# Patient Record
Sex: FEMALE | Race: WHITE | Employment: OTHER | URBAN - NONMETROPOLITAN AREA
[De-identification: names, ages, dates, MRNs, and addresses within clinical notes are randomized per-mention and may not be internally consistent; named-entity substitution may affect disease eponyms.]

---

## 2017-05-02 VITALS
HEART RATE: 80 BPM | BODY MASS INDEX: 32.73 KG/M2 | HEIGHT: 69 IN | WEIGHT: 221 LBS | SYSTOLIC BLOOD PRESSURE: 120 MMHG | DIASTOLIC BLOOD PRESSURE: 80 MMHG

## 2017-05-02 DIAGNOSIS — R53.82 CHRONIC FATIGUE: ICD-10-CM

## 2017-05-02 DIAGNOSIS — N18.1 TYPE 2 DIABETES MELLITUS WITH STAGE 1 CHRONIC KIDNEY DISEASE, UNSPECIFIED LONG TERM INSULIN USE STATUS: ICD-10-CM

## 2017-05-02 DIAGNOSIS — F41.9 ANXIETY: ICD-10-CM

## 2017-05-02 DIAGNOSIS — I48.20 CHRONIC ATRIAL FIBRILLATION (HCC): ICD-10-CM

## 2017-05-02 DIAGNOSIS — E11.22 TYPE 2 DIABETES MELLITUS WITH STAGE 1 CHRONIC KIDNEY DISEASE, UNSPECIFIED LONG TERM INSULIN USE STATUS: ICD-10-CM

## 2017-05-02 DIAGNOSIS — N18.9 CHRONIC KIDNEY DISEASE, UNSPECIFIED: ICD-10-CM

## 2017-05-02 DIAGNOSIS — J32.9 SINUSITIS, UNSPECIFIED CHRONICITY, UNSPECIFIED LOCATION: ICD-10-CM

## 2017-05-02 DIAGNOSIS — K58.9 IRRITABLE BOWEL SYNDROME, UNSPECIFIED TYPE: ICD-10-CM

## 2017-05-02 DIAGNOSIS — I25.10 CORONARY ARTERY DISEASE INVOLVING NATIVE HEART, ANGINA PRESENCE UNSPECIFIED, UNSPECIFIED VESSEL OR LESION TYPE: ICD-10-CM

## 2017-05-02 RX ORDER — FLUTICASONE PROPIONATE 50 MCG
1 SPRAY, SUSPENSION (ML) NASAL DAILY
COMMUNITY
End: 2017-12-11 | Stop reason: ALTCHOICE

## 2017-05-02 RX ORDER — DULOXETIN HYDROCHLORIDE 60 MG/1
60 CAPSULE, DELAYED RELEASE ORAL DAILY
COMMUNITY
End: 2017-05-25 | Stop reason: SDUPTHER

## 2017-05-02 RX ORDER — LANOLIN ALCOHOL/MO/W.PET/CERES
3 CREAM (GRAM) TOPICAL NIGHTLY PRN
COMMUNITY

## 2017-05-02 RX ORDER — RANITIDINE HCL 75 MG
75 TABLET ORAL PRN
COMMUNITY
End: 2018-06-28

## 2017-05-02 RX ORDER — BENZONATATE 100 MG/1
100 CAPSULE ORAL 3 TIMES DAILY PRN
COMMUNITY
End: 2017-05-03 | Stop reason: ALTCHOICE

## 2017-05-02 RX ORDER — LANOLIN ALCOHOL/MO/W.PET/CERES
1000 CREAM (GRAM) TOPICAL DAILY
COMMUNITY

## 2017-05-02 RX ORDER — FUROSEMIDE 20 MG/1
20 TABLET ORAL 2 TIMES DAILY
COMMUNITY
End: 2018-03-07 | Stop reason: SDUPTHER

## 2017-05-02 RX ORDER — NEBIVOLOL 5 MG/1
5 TABLET ORAL DAILY
COMMUNITY
End: 2018-05-10 | Stop reason: SDUPTHER

## 2017-05-02 RX ORDER — LATANOPROST 50 UG/ML
1 SOLUTION/ DROPS OPHTHALMIC NIGHTLY
COMMUNITY
End: 2017-12-11 | Stop reason: ALTCHOICE

## 2017-05-03 ENCOUNTER — OFFICE VISIT (OUTPATIENT)
Dept: FAMILY MEDICINE CLINIC | Age: 82
End: 2017-05-03
Payer: MEDICARE

## 2017-05-03 VITALS
HEART RATE: 76 BPM | WEIGHT: 225 LBS | DIASTOLIC BLOOD PRESSURE: 72 MMHG | SYSTOLIC BLOOD PRESSURE: 138 MMHG | HEIGHT: 68 IN | BODY MASS INDEX: 34.1 KG/M2

## 2017-05-03 DIAGNOSIS — Z95.0 PACEMAKER, ARTIFICIAL: ICD-10-CM

## 2017-05-03 DIAGNOSIS — I65.23 CAROTID STENOSIS, BILATERAL: ICD-10-CM

## 2017-05-03 DIAGNOSIS — I67.9 CEREBROVASCULAR DISEASE: ICD-10-CM

## 2017-05-03 DIAGNOSIS — Z78.0 ASYMPTOMATIC MENOPAUSAL STATE: ICD-10-CM

## 2017-05-03 DIAGNOSIS — R26.81 UNSTEADY GAIT: ICD-10-CM

## 2017-05-03 DIAGNOSIS — I10 ESSENTIAL HYPERTENSION: ICD-10-CM

## 2017-05-03 DIAGNOSIS — N18.30 CHRONIC KIDNEY DISEASE, STAGE 3 (HCC): ICD-10-CM

## 2017-05-03 DIAGNOSIS — Z00.00 ROUTINE GENERAL MEDICAL EXAMINATION AT A HEALTH CARE FACILITY: ICD-10-CM

## 2017-05-03 DIAGNOSIS — Z00.00 MEDICARE ANNUAL WELLNESS VISIT, SUBSEQUENT: Primary | ICD-10-CM

## 2017-05-03 DIAGNOSIS — E78.2 MIXED HYPERLIPIDEMIA: ICD-10-CM

## 2017-05-03 LAB — HBA1C MFR BLD: 8.3 %

## 2017-05-03 PROCEDURE — G0444 DEPRESSION SCREEN ANNUAL: HCPCS | Performed by: FAMILY MEDICINE

## 2017-05-03 PROCEDURE — 99214 OFFICE O/P EST MOD 30 MIN: CPT | Performed by: FAMILY MEDICINE

## 2017-05-03 PROCEDURE — 1123F ACP DISCUSS/DSCN MKR DOCD: CPT | Performed by: FAMILY MEDICINE

## 2017-05-03 PROCEDURE — G8598 ASA/ANTIPLAT THER USED: HCPCS | Performed by: FAMILY MEDICINE

## 2017-05-03 PROCEDURE — 1090F PRES/ABSN URINE INCON ASSESS: CPT | Performed by: FAMILY MEDICINE

## 2017-05-03 PROCEDURE — G8417 CALC BMI ABV UP PARAM F/U: HCPCS | Performed by: FAMILY MEDICINE

## 2017-05-03 PROCEDURE — 83036 HEMOGLOBIN GLYCOSYLATED A1C: CPT | Performed by: FAMILY MEDICINE

## 2017-05-03 PROCEDURE — G8427 DOCREV CUR MEDS BY ELIG CLIN: HCPCS | Performed by: FAMILY MEDICINE

## 2017-05-03 PROCEDURE — 4040F PNEUMOC VAC/ADMIN/RCVD: CPT | Performed by: FAMILY MEDICINE

## 2017-05-03 PROCEDURE — G0439 PPPS, SUBSEQ VISIT: HCPCS | Performed by: FAMILY MEDICINE

## 2017-05-03 PROCEDURE — 1036F TOBACCO NON-USER: CPT | Performed by: FAMILY MEDICINE

## 2017-05-03 PROCEDURE — G8400 PT W/DXA NO RESULTS DOC: HCPCS | Performed by: FAMILY MEDICINE

## 2017-05-03 RX ORDER — TIMOLOL MALEATE 5 MG/ML
1 SOLUTION/ DROPS OPHTHALMIC 2 TIMES DAILY
Refills: 0 | COMMUNITY
Start: 2017-02-15 | End: 2019-07-05 | Stop reason: ALTCHOICE

## 2017-05-03 ASSESSMENT — LIFESTYLE VARIABLES
HOW OFTEN DURING THE LAST YEAR HAVE YOU FOUND THAT YOU WERE NOT ABLE TO STOP DRINKING ONCE YOU HAD STARTED: 0
HOW OFTEN DURING THE LAST YEAR HAVE YOU NEEDED AN ALCOHOLIC DRINK FIRST THING IN THE MORNING TO GET YOURSELF GOING AFTER A NIGHT OF HEAVY DRINKING: 0
HOW OFTEN DURING THE LAST YEAR HAVE YOU BEEN UNABLE TO REMEMBER WHAT HAPPENED THE NIGHT BEFORE BECAUSE YOU HAD BEEN DRINKING: 0
AUDIT-C TOTAL SCORE: 2
HOW MANY STANDARD DRINKS CONTAINING ALCOHOL DO YOU HAVE ON A TYPICAL DAY: 0
AUDIT TOTAL SCORE: 2
HOW OFTEN DURING THE LAST YEAR HAVE YOU FAILED TO DO WHAT WAS NORMALLY EXPECTED FROM YOU BECAUSE OF DRINKING: 0
HAS A RELATIVE, FRIEND, DOCTOR, OR ANOTHER HEALTH PROFESSIONAL EXPRESSED CONCERN ABOUT YOUR DRINKING OR SUGGESTED YOU CUT DOWN: 0
HAVE YOU OR SOMEONE ELSE BEEN INJURED AS A RESULT OF YOUR DRINKING: 0
HOW OFTEN DO YOU HAVE SIX OR MORE DRINKS ON ONE OCCASION: 0
HOW OFTEN DO YOU HAVE A DRINK CONTAINING ALCOHOL: 2
HOW OFTEN DURING THE LAST YEAR HAVE YOU HAD A FEELING OF GUILT OR REMORSE AFTER DRINKING: 0

## 2017-05-03 ASSESSMENT — ENCOUNTER SYMPTOMS
VISUAL CHANGE: 1
BLURRED VISION: 1

## 2017-05-03 ASSESSMENT — ANXIETY QUESTIONNAIRES: GAD7 TOTAL SCORE: 5

## 2017-05-11 DIAGNOSIS — Z78.0 ASYMPTOMATIC MENOPAUSAL STATE: ICD-10-CM

## 2017-05-25 RX ORDER — DULOXETIN HYDROCHLORIDE 60 MG/1
CAPSULE, DELAYED RELEASE ORAL
Qty: 90 CAPSULE | Refills: 3 | Status: SHIPPED | OUTPATIENT
Start: 2017-05-25 | End: 2018-05-10 | Stop reason: SDUPTHER

## 2017-05-26 ENCOUNTER — TELEPHONE (OUTPATIENT)
Dept: FAMILY MEDICINE CLINIC | Age: 82
End: 2017-05-26

## 2017-06-07 ENCOUNTER — OFFICE VISIT (OUTPATIENT)
Dept: NUTRITION | Age: 82
End: 2017-06-07
Payer: MEDICARE

## 2017-06-07 DIAGNOSIS — N18.9 CHRONIC KIDNEY DISEASE, UNSPECIFIED: Primary | ICD-10-CM

## 2017-06-07 DIAGNOSIS — K58.9 IRRITABLE BOWEL SYNDROME, UNSPECIFIED TYPE: ICD-10-CM

## 2017-06-07 DIAGNOSIS — N18.30 CHRONIC KIDNEY DISEASE, STAGE 3 (HCC): ICD-10-CM

## 2017-06-07 DIAGNOSIS — N18.1 TYPE 2 DIABETES MELLITUS WITH STAGE 1 CHRONIC KIDNEY DISEASE, UNSPECIFIED LONG TERM INSULIN USE STATUS: ICD-10-CM

## 2017-06-07 DIAGNOSIS — I10 ESSENTIAL HYPERTENSION: ICD-10-CM

## 2017-06-07 DIAGNOSIS — E11.22 TYPE 2 DIABETES MELLITUS WITH STAGE 1 CHRONIC KIDNEY DISEASE, UNSPECIFIED LONG TERM INSULIN USE STATUS: ICD-10-CM

## 2017-06-07 DIAGNOSIS — E78.2 MIXED HYPERLIPIDEMIA: ICD-10-CM

## 2017-06-07 DIAGNOSIS — I65.23 CAROTID STENOSIS, BILATERAL: ICD-10-CM

## 2017-06-07 DIAGNOSIS — I67.9 CEREBROVASCULAR DISEASE: ICD-10-CM

## 2017-06-07 DIAGNOSIS — I25.10 CORONARY ARTERY DISEASE INVOLVING NATIVE HEART, ANGINA PRESENCE UNSPECIFIED, UNSPECIFIED VESSEL OR LESION TYPE: ICD-10-CM

## 2017-06-07 PROCEDURE — 99999 PR OFFICE/OUTPT VISIT,PROCEDURE ONLY: CPT | Performed by: DIETITIAN, REGISTERED

## 2017-06-07 PROCEDURE — G8428 CUR MEDS NOT DOCUMENT: HCPCS | Performed by: DIETITIAN, REGISTERED

## 2017-06-07 PROCEDURE — 1036F TOBACCO NON-USER: CPT | Performed by: DIETITIAN, REGISTERED

## 2017-06-07 PROCEDURE — G8417 CALC BMI ABV UP PARAM F/U: HCPCS | Performed by: DIETITIAN, REGISTERED

## 2017-06-07 PROCEDURE — 97802 MEDICAL NUTRITION INDIV IN: CPT | Performed by: DIETITIAN, REGISTERED

## 2017-07-24 ENCOUNTER — TELEPHONE (OUTPATIENT)
Dept: FAMILY MEDICINE CLINIC | Age: 82
End: 2017-07-24

## 2017-07-31 ENCOUNTER — TELEPHONE (OUTPATIENT)
Dept: FAMILY MEDICINE CLINIC | Age: 82
End: 2017-07-31

## 2017-09-06 ENCOUNTER — OFFICE VISIT (OUTPATIENT)
Dept: FAMILY MEDICINE CLINIC | Age: 82
End: 2017-09-06
Payer: MEDICARE

## 2017-09-06 VITALS
WEIGHT: 221 LBS | HEART RATE: 58 BPM | BODY MASS INDEX: 34.1 KG/M2 | DIASTOLIC BLOOD PRESSURE: 80 MMHG | SYSTOLIC BLOOD PRESSURE: 122 MMHG

## 2017-09-06 DIAGNOSIS — R35.89 POLYURIA: ICD-10-CM

## 2017-09-06 DIAGNOSIS — N18.9 CHRONIC KIDNEY DISEASE, UNSPECIFIED: ICD-10-CM

## 2017-09-06 DIAGNOSIS — R53.82 CHRONIC FATIGUE: ICD-10-CM

## 2017-09-06 DIAGNOSIS — N18.1 TYPE 2 DIABETES MELLITUS WITH STAGE 1 CHRONIC KIDNEY DISEASE, WITH LONG-TERM CURRENT USE OF INSULIN (HCC): Primary | ICD-10-CM

## 2017-09-06 DIAGNOSIS — Z79.4 TYPE 2 DIABETES MELLITUS WITH STAGE 1 CHRONIC KIDNEY DISEASE, WITH LONG-TERM CURRENT USE OF INSULIN (HCC): Primary | ICD-10-CM

## 2017-09-06 DIAGNOSIS — I48.20 CHRONIC ATRIAL FIBRILLATION (HCC): ICD-10-CM

## 2017-09-06 DIAGNOSIS — E11.22 TYPE 2 DIABETES MELLITUS WITH STAGE 1 CHRONIC KIDNEY DISEASE, WITH LONG-TERM CURRENT USE OF INSULIN (HCC): Primary | ICD-10-CM

## 2017-09-06 DIAGNOSIS — F41.9 ANXIETY: ICD-10-CM

## 2017-09-06 LAB
BACTERIA URINE, POC: ABNORMAL
BILIRUBIN URINE: 0 MG/DL
BLOOD, URINE: POSITIVE
CASTS URINE, POC: ABNORMAL
CLARITY: CLEAR
COLOR: YELLOW
CRYSTALS URINE, POC: ABNORMAL
EPI CELLS URINE, POC: ABNORMAL
GLUCOSE URINE: ABNORMAL
HBA1C MFR BLD: 8.4 %
KETONES, URINE: NEGATIVE
LEUKOCYTE EST, POC: ABNORMAL
NITRITE, URINE: NEGATIVE
PH UA: 5 (ref 4.5–8)
PROTEIN UA: POSITIVE
RBC URINE, POC: ABNORMAL
SPECIFIC GRAVITY UA: 1.01 (ref 1–1.03)
URINE CULTURE, ROUTINE: NORMAL
UROBILINOGEN, URINE: NORMAL
WBC URINE, POC: ABNORMAL
YEAST URINE, POC: ABNORMAL

## 2017-09-06 PROCEDURE — G8417 CALC BMI ABV UP PARAM F/U: HCPCS | Performed by: FAMILY MEDICINE

## 2017-09-06 PROCEDURE — 4040F PNEUMOC VAC/ADMIN/RCVD: CPT | Performed by: FAMILY MEDICINE

## 2017-09-06 PROCEDURE — 83036 HEMOGLOBIN GLYCOSYLATED A1C: CPT | Performed by: FAMILY MEDICINE

## 2017-09-06 PROCEDURE — G8427 DOCREV CUR MEDS BY ELIG CLIN: HCPCS | Performed by: FAMILY MEDICINE

## 2017-09-06 PROCEDURE — G8400 PT W/DXA NO RESULTS DOC: HCPCS | Performed by: FAMILY MEDICINE

## 2017-09-06 PROCEDURE — G8598 ASA/ANTIPLAT THER USED: HCPCS | Performed by: FAMILY MEDICINE

## 2017-09-06 PROCEDURE — 1123F ACP DISCUSS/DSCN MKR DOCD: CPT | Performed by: FAMILY MEDICINE

## 2017-09-06 PROCEDURE — 1036F TOBACCO NON-USER: CPT | Performed by: FAMILY MEDICINE

## 2017-09-06 PROCEDURE — 1090F PRES/ABSN URINE INCON ASSESS: CPT | Performed by: FAMILY MEDICINE

## 2017-09-06 PROCEDURE — 81000 URINALYSIS NONAUTO W/SCOPE: CPT | Performed by: FAMILY MEDICINE

## 2017-09-06 PROCEDURE — 99214 OFFICE O/P EST MOD 30 MIN: CPT | Performed by: FAMILY MEDICINE

## 2017-09-06 ASSESSMENT — ENCOUNTER SYMPTOMS: BLURRED VISION: 1

## 2017-09-11 RX ORDER — INSULIN GLARGINE 300 U/ML
INJECTION, SOLUTION SUBCUTANEOUS
Qty: 13.5 ML | Refills: 3 | Status: SHIPPED | OUTPATIENT
Start: 2017-09-11 | End: 2017-12-11

## 2017-10-09 ENCOUNTER — TELEPHONE (OUTPATIENT)
Dept: FAMILY MEDICINE CLINIC | Age: 82
End: 2017-10-09

## 2017-10-18 ENCOUNTER — TELEPHONE (OUTPATIENT)
Dept: FAMILY MEDICINE CLINIC | Age: 82
End: 2017-10-18

## 2017-10-18 DIAGNOSIS — E11.22 TYPE 2 DIABETES MELLITUS WITH STAGE 1 CHRONIC KIDNEY DISEASE, WITH LONG-TERM CURRENT USE OF INSULIN (HCC): Primary | ICD-10-CM

## 2017-10-18 DIAGNOSIS — N18.1 TYPE 2 DIABETES MELLITUS WITH STAGE 1 CHRONIC KIDNEY DISEASE, WITH LONG-TERM CURRENT USE OF INSULIN (HCC): Primary | ICD-10-CM

## 2017-10-18 DIAGNOSIS — Z79.4 TYPE 2 DIABETES MELLITUS WITH STAGE 1 CHRONIC KIDNEY DISEASE, WITH LONG-TERM CURRENT USE OF INSULIN (HCC): Primary | ICD-10-CM

## 2017-10-18 NOTE — TELEPHONE ENCOUNTER
Flex pen, wont let me put in for refill    [facility] request [facility] on the following medication(s):  Requested Prescriptions      No prescriptions requested or ordered in this encounter       Last Visit Date (If Applicable):  4/6/5404    Next Visit Date:    12/11/2017

## 2017-12-05 ENCOUNTER — NURSE ONLY (OUTPATIENT)
Dept: FAMILY MEDICINE CLINIC | Age: 82
End: 2017-12-05
Payer: MEDICARE

## 2017-12-05 ENCOUNTER — TELEPHONE (OUTPATIENT)
Dept: FAMILY MEDICINE CLINIC | Age: 82
End: 2017-12-05

## 2017-12-05 VITALS
HEART RATE: 90 BPM | WEIGHT: 225 LBS | SYSTOLIC BLOOD PRESSURE: 170 MMHG | DIASTOLIC BLOOD PRESSURE: 92 MMHG | RESPIRATION RATE: 16 BRPM | BODY MASS INDEX: 34.72 KG/M2

## 2017-12-05 DIAGNOSIS — R82.90 CLOUDY URINE: Primary | ICD-10-CM

## 2017-12-05 DIAGNOSIS — R82.90 ABNORMAL URINALYSIS: Primary | ICD-10-CM

## 2017-12-05 LAB
BILIRUBIN, POC: ABNORMAL
BLOOD URINE, POC: ABNORMAL
CLARITY, POC: ABNORMAL
COLOR, POC: ABNORMAL
GLUCOSE URINE, POC: 250
KETONES, POC: ABNORMAL
LEUKOCYTE EST, POC: 2
NITRITE, POC: ABNORMAL
PH, POC: 6
PROTEIN, POC: ABNORMAL
SPECIFIC GRAVITY, POC: 1.01
URINE CULTURE, ROUTINE: NORMAL
UROBILINOGEN, POC: ABNORMAL

## 2017-12-05 PROCEDURE — 81002 URINALYSIS NONAUTO W/O SCOPE: CPT | Performed by: NURSE PRACTITIONER

## 2017-12-05 PROCEDURE — 99211 OFF/OP EST MAY X REQ PHY/QHP: CPT | Performed by: FAMILY MEDICINE

## 2017-12-05 NOTE — TELEPHONE ENCOUNTER
Order accomplished for urine culture. Please forward then for AMM review as pt had negative culture accomplished in September also to confirm not needing referral/treatment for interstitial cystitis.

## 2017-12-05 NOTE — TELEPHONE ENCOUNTER
Took urine sample and did nurse visit for pt, POCT Urinalysis results are available in system. Requesting order for culture.   Pharmacy confirmed as [facility]

## 2017-12-06 RX ORDER — SULFAMETHOXAZOLE AND TRIMETHOPRIM 400; 80 MG/1; MG/1
1 TABLET ORAL 2 TIMES DAILY
Qty: 14 TABLET | Refills: 0 | Status: SHIPPED | OUTPATIENT
Start: 2017-12-06 | End: 2017-12-11 | Stop reason: ALTCHOICE

## 2017-12-07 ENCOUNTER — TELEPHONE (OUTPATIENT)
Dept: FAMILY MEDICINE CLINIC | Age: 82
End: 2017-12-07

## 2017-12-07 NOTE — TELEPHONE ENCOUNTER
Her culture had too much over growth on the petri dish so they were unable to identify any organisms. Do you want her to repeat the urine, she can take a sample in  There .  She is currently on ATB

## 2017-12-11 ENCOUNTER — OFFICE VISIT (OUTPATIENT)
Dept: FAMILY MEDICINE CLINIC | Age: 82
End: 2017-12-11
Payer: MEDICARE

## 2017-12-11 VITALS
SYSTOLIC BLOOD PRESSURE: 128 MMHG | DIASTOLIC BLOOD PRESSURE: 84 MMHG | OXYGEN SATURATION: 96 % | WEIGHT: 221 LBS | TEMPERATURE: 96.9 F | BODY MASS INDEX: 34.1 KG/M2 | HEART RATE: 63 BPM

## 2017-12-11 DIAGNOSIS — I65.23 CAROTID STENOSIS, BILATERAL: ICD-10-CM

## 2017-12-11 DIAGNOSIS — E11.00 UNCONTROLLED TYPE 2 DIABETES MELLITUS WITH HYPEROSMOLARITY WITHOUT COMA, UNSPECIFIED LONG TERM INSULIN USE STATUS: Primary | ICD-10-CM

## 2017-12-11 DIAGNOSIS — N18.30 CHRONIC KIDNEY DISEASE, STAGE 3 (HCC): ICD-10-CM

## 2017-12-11 DIAGNOSIS — I48.20 CHRONIC ATRIAL FIBRILLATION (HCC): ICD-10-CM

## 2017-12-11 DIAGNOSIS — I10 ESSENTIAL HYPERTENSION: ICD-10-CM

## 2017-12-11 LAB — HBA1C MFR BLD: 8.5 %

## 2017-12-11 PROCEDURE — G8598 ASA/ANTIPLAT THER USED: HCPCS | Performed by: FAMILY MEDICINE

## 2017-12-11 PROCEDURE — 1123F ACP DISCUSS/DSCN MKR DOCD: CPT | Performed by: FAMILY MEDICINE

## 2017-12-11 PROCEDURE — 1036F TOBACCO NON-USER: CPT | Performed by: FAMILY MEDICINE

## 2017-12-11 PROCEDURE — 83036 HEMOGLOBIN GLYCOSYLATED A1C: CPT | Performed by: FAMILY MEDICINE

## 2017-12-11 PROCEDURE — 1090F PRES/ABSN URINE INCON ASSESS: CPT | Performed by: FAMILY MEDICINE

## 2017-12-11 PROCEDURE — G8417 CALC BMI ABV UP PARAM F/U: HCPCS | Performed by: FAMILY MEDICINE

## 2017-12-11 PROCEDURE — G8427 DOCREV CUR MEDS BY ELIG CLIN: HCPCS | Performed by: FAMILY MEDICINE

## 2017-12-11 PROCEDURE — 99214 OFFICE O/P EST MOD 30 MIN: CPT | Performed by: FAMILY MEDICINE

## 2017-12-11 PROCEDURE — G8400 PT W/DXA NO RESULTS DOC: HCPCS | Performed by: FAMILY MEDICINE

## 2017-12-11 PROCEDURE — G8484 FLU IMMUNIZE NO ADMIN: HCPCS | Performed by: FAMILY MEDICINE

## 2017-12-11 PROCEDURE — 4040F PNEUMOC VAC/ADMIN/RCVD: CPT | Performed by: FAMILY MEDICINE

## 2017-12-11 ASSESSMENT — ENCOUNTER SYMPTOMS
BACK PAIN: 1
CONSTIPATION: 1
DIARRHEA: 1
ABDOMINAL PAIN: 1

## 2017-12-11 NOTE — PROGRESS NOTES
Review of Systems   Constitutional: Negative for activity change and appetite change. Cardiovascular: Negative for chest pain and palpitations. Gastrointestinal: Positive for abdominal pain (left and lwer side, sometimes really sharp in the upper quad), constipation and diarrhea (mostly diarrhea). Thinks she has heart burn on occassion   Endocrine: Negative for polydipsia, polyphagia and polyuria. Genitourinary: Negative for frequency and urgency. Musculoskeletal: Positive for back pain. Psychiatric/Behavioral: Negative for sleep disturbance.
[Beclomethasone] Swelling    Vasotec [Enalapril Maleate]     Vicodin [Hydrocodone-Acetaminophen]     Voltaren [Diclofenac Sodium]        Health Maintenance   Topic Date Due    DTaP/Tdap/Td vaccine (1 - Tdap) 09[birth year]20/1955    Zostavax vaccine  09/20/1996    DEXA (modify frequency per FRAX score)  09/20/2001    Pneumococcal low/med risk (2 of 2 - PCV13) 07/23/2009    Flu vaccine (1) 09/01/2017       Subjective:      Review of Systems  Constitutional: Negative for activity change and appetite change. Cardiovascular: Negative for chest pain and palpitations. Gastrointestinal: Positive for abdominal pain (left and lwer side, sometimes really sharp in the upper quad), constipation and diarrhea (mostly diarrhea). Thinks she has heart burn on occassion   Endocrine: Negative for polydipsia, polyphagia and polyuria. Genitourinary: Negative for frequency and urgency. Musculoskeletal: Positive for back pain. Psychiatric/Behavioral: Negative for sleep disturbance. Objective:     /84   Pulse 63   Temp 96.9 °F (36.1 °C) (Tympanic)   Wt 221 lb (100.2 kg)   SpO2 96%   BMI 34.10 kg/m²     Physical Exam   Constitutional: She is oriented to person, place, and time. She appears well-developed and well-nourished. HENT:   Head: Normocephalic and atraumatic. Right Ear: Tympanic membrane and ear canal normal.   Left Ear: Tympanic membrane and ear canal normal.   Eyes: Conjunctivae and EOM are normal.   Neck: Normal range of motion. Neck supple. No JVD present. No thyromegaly present. Cardiovascular: Normal rate, S1 normal and S2 normal.  An irregularly irregular rhythm present. Exam reveals no gallop, no friction rub and no decreased pulses. No murmur heard. Pulses:       Carotid pulses are 1+ on the left side. Dorsalis pedis pulses are 1+ on the right side, and 1+ on the left side. Pulmonary/Chest: Effort normal and breath sounds normal. No respiratory distress.    Musculoskeletal: She

## 2018-03-02 LAB
BUN BLDV-MCNC: 29 MG/DL (ref 7–18)
BUN BLDV-MCNC: 29 MG/DL (ref 7–18)
CALCIUM SERPL-MCNC: 9 MG/DL (ref 8.5–10.1)
CALCIUM SERPL-MCNC: 9 MG/DL (ref 8.5–10.1)
CHLORIDE BLD-SCNC: 107 MMOL/L (ref 97–107)
CHLORIDE BLD-SCNC: 107 MMOL/L (ref 97–107)
CO2: 28 MMOL/L (ref 21–32)
CO2: 28 MMOL/L (ref 21–32)
CREAT SERPL-MCNC: 1.36 MG/DL (ref 0.55–1.02)
CREAT SERPL-MCNC: 1.39 MG/DL (ref 0.55–1.02)
GFR CALCULATED: 39
GFR CALCULATED: 40
GLUCOSE: 179 MG/DL (ref 70–99)
GLUCOSE: 180 MG/DL (ref 70–99)
HCT VFR BLD CALC: 42.7 % (ref 34.6–44.1)
HEMOGLOBIN: 14.3 G/DL (ref 11.7–14.9)
MCH RBC QN AUTO: 32.4 PG (ref 27.8–33.2)
MCHC RBC AUTO-ENTMCNC: 33.6 G/DL (ref 32.7–34.8)
MCV RBC AUTO: 96.5 FL (ref 83–97.4)
PDW BLD-RTO: 13.2 % (ref 12.2–15.8)
PHOSPHORUS: 3.7 MG/DL (ref 2.5–4.9)
PLATELET # BLD: 224 10'3/UL (ref 122–359)
PMV BLD AUTO: 8.6 FL (ref 7.6–10.6)
POTASSIUM SERPL-SCNC: 4.5 MMOL/L (ref 3.5–5.1)
POTASSIUM SERPL-SCNC: 4.6 MMOL/L (ref 3.5–5.1)
RBC # BLD: 4.43 10'6/UL (ref 3.85–4.88)
SODIUM BLD-SCNC: 142 MMOL/L (ref 136–145)
SODIUM BLD-SCNC: 142 MMOL/L (ref 136–145)
TSH SERPL DL<=0.05 MIU/L-ACNC: 0.78 UIU/ML (ref 0.36–3.74)
VITAMIN B-12: 1059 PG/ML (ref 254–1320)
WBC: 7.3 10'3/UL (ref 3.2–9.3)

## 2018-04-16 ENCOUNTER — OFFICE VISIT (OUTPATIENT)
Dept: FAMILY MEDICINE CLINIC | Age: 83
End: 2018-04-16
Payer: MEDICARE

## 2018-04-16 VITALS
HEART RATE: 68 BPM | DIASTOLIC BLOOD PRESSURE: 80 MMHG | BODY MASS INDEX: 34.26 KG/M2 | SYSTOLIC BLOOD PRESSURE: 110 MMHG | WEIGHT: 222 LBS

## 2018-04-16 DIAGNOSIS — I48.20 CHRONIC ATRIAL FIBRILLATION (HCC): ICD-10-CM

## 2018-04-16 DIAGNOSIS — E11.21 TYPE 2 DIABETES MELLITUS WITH DIABETIC NEPHROPATHY, WITH LONG-TERM CURRENT USE OF INSULIN (HCC): ICD-10-CM

## 2018-04-16 DIAGNOSIS — I25.10 CORONARY ARTERY DISEASE INVOLVING NATIVE HEART, ANGINA PRESENCE UNSPECIFIED, UNSPECIFIED VESSEL OR LESION TYPE: ICD-10-CM

## 2018-04-16 DIAGNOSIS — Z79.4 TYPE 2 DIABETES MELLITUS WITH DIABETIC NEPHROPATHY, WITH LONG-TERM CURRENT USE OF INSULIN (HCC): ICD-10-CM

## 2018-04-16 DIAGNOSIS — Z95.0 PACEMAKER, ARTIFICIAL: ICD-10-CM

## 2018-04-16 DIAGNOSIS — I10 ESSENTIAL HYPERTENSION: ICD-10-CM

## 2018-04-16 DIAGNOSIS — N18.30 STAGE 3 CHRONIC KIDNEY DISEASE (HCC): ICD-10-CM

## 2018-04-16 LAB — HBA1C MFR BLD: 7.9 %

## 2018-04-16 PROCEDURE — G8417 CALC BMI ABV UP PARAM F/U: HCPCS | Performed by: FAMILY MEDICINE

## 2018-04-16 PROCEDURE — 4040F PNEUMOC VAC/ADMIN/RCVD: CPT | Performed by: FAMILY MEDICINE

## 2018-04-16 PROCEDURE — 83036 HEMOGLOBIN GLYCOSYLATED A1C: CPT | Performed by: FAMILY MEDICINE

## 2018-04-16 PROCEDURE — 1036F TOBACCO NON-USER: CPT | Performed by: FAMILY MEDICINE

## 2018-04-16 PROCEDURE — G8598 ASA/ANTIPLAT THER USED: HCPCS | Performed by: FAMILY MEDICINE

## 2018-04-16 PROCEDURE — 1123F ACP DISCUSS/DSCN MKR DOCD: CPT | Performed by: FAMILY MEDICINE

## 2018-04-16 PROCEDURE — G8427 DOCREV CUR MEDS BY ELIG CLIN: HCPCS | Performed by: FAMILY MEDICINE

## 2018-04-16 PROCEDURE — 1090F PRES/ABSN URINE INCON ASSESS: CPT | Performed by: FAMILY MEDICINE

## 2018-04-16 PROCEDURE — G8400 PT W/DXA NO RESULTS DOC: HCPCS | Performed by: FAMILY MEDICINE

## 2018-04-16 PROCEDURE — 99214 OFFICE O/P EST MOD 30 MIN: CPT | Performed by: FAMILY MEDICINE

## 2018-04-16 ASSESSMENT — ENCOUNTER SYMPTOMS
HEARTBURN: 1
SHORTNESS OF BREATH: 1

## 2018-05-10 RX ORDER — DULOXETIN HYDROCHLORIDE 60 MG/1
CAPSULE, DELAYED RELEASE ORAL
Qty: 90 CAPSULE | Refills: 3 | Status: SHIPPED | OUTPATIENT
Start: 2018-05-10 | End: 2019-06-22 | Stop reason: SDUPTHER

## 2018-05-10 RX ORDER — NEBIVOLOL HYDROCHLORIDE 5 MG/1
TABLET ORAL
Qty: 90 TABLET | Refills: 3 | Status: SHIPPED | OUTPATIENT
Start: 2018-05-10 | End: 2019-03-26 | Stop reason: SDUPTHER

## 2018-06-27 ENCOUNTER — TELEPHONE (OUTPATIENT)
Dept: FAMILY MEDICINE CLINIC | Age: 83
End: 2018-06-27

## 2018-06-27 NOTE — TELEPHONE ENCOUNTER
States she is suppose to have a follow up appt in August, just not feeling well and would like to discuss some things. Has seen some different doctors and Is suppose to start taking some new medications, would like your thoughts before doing so. States her sugar seems to be doing OK, legs are achy, weight is fluctuating. Is suppose to start taking a baby aspirin and another medication, unsure of the name of it, it is suppose to be at the pharmacy. Thought the doctor mentioned a statin medication, unsure if she should start the medication or not. APPT scheduled for tomorrow morning to discuss concerns.

## 2018-06-28 ENCOUNTER — OFFICE VISIT (OUTPATIENT)
Dept: FAMILY MEDICINE CLINIC | Age: 83
End: 2018-06-28
Payer: MEDICARE

## 2018-06-28 VITALS
SYSTOLIC BLOOD PRESSURE: 124 MMHG | HEART RATE: 64 BPM | WEIGHT: 222 LBS | BODY MASS INDEX: 34.26 KG/M2 | DIASTOLIC BLOOD PRESSURE: 80 MMHG

## 2018-06-28 DIAGNOSIS — N18.1 TYPE 2 DIABETES MELLITUS WITH STAGE 1 CHRONIC KIDNEY DISEASE, WITH LONG-TERM CURRENT USE OF INSULIN (HCC): ICD-10-CM

## 2018-06-28 DIAGNOSIS — I67.9 CEREBROVASCULAR DISEASE: ICD-10-CM

## 2018-06-28 DIAGNOSIS — R26.9 ABNORMALITY OF GAIT AND MOBILITY: ICD-10-CM

## 2018-06-28 DIAGNOSIS — Z79.4 TYPE 2 DIABETES MELLITUS WITH STAGE 1 CHRONIC KIDNEY DISEASE, WITH LONG-TERM CURRENT USE OF INSULIN (HCC): ICD-10-CM

## 2018-06-28 DIAGNOSIS — I65.23 CAROTID STENOSIS, BILATERAL: Primary | ICD-10-CM

## 2018-06-28 DIAGNOSIS — R44.1 VISUAL HALLUCINATIONS: ICD-10-CM

## 2018-06-28 DIAGNOSIS — E11.22 TYPE 2 DIABETES MELLITUS WITH STAGE 1 CHRONIC KIDNEY DISEASE, WITH LONG-TERM CURRENT USE OF INSULIN (HCC): ICD-10-CM

## 2018-06-28 DIAGNOSIS — I73.9 PERIPHERAL VASCULAR DISEASE WITH CLAUDICATION (HCC): ICD-10-CM

## 2018-06-28 PROCEDURE — 99214 OFFICE O/P EST MOD 30 MIN: CPT | Performed by: FAMILY MEDICINE

## 2018-06-28 PROCEDURE — 1036F TOBACCO NON-USER: CPT | Performed by: FAMILY MEDICINE

## 2018-06-28 PROCEDURE — G8598 ASA/ANTIPLAT THER USED: HCPCS | Performed by: FAMILY MEDICINE

## 2018-06-28 PROCEDURE — 1090F PRES/ABSN URINE INCON ASSESS: CPT | Performed by: FAMILY MEDICINE

## 2018-06-28 PROCEDURE — G8400 PT W/DXA NO RESULTS DOC: HCPCS | Performed by: FAMILY MEDICINE

## 2018-06-28 PROCEDURE — G8427 DOCREV CUR MEDS BY ELIG CLIN: HCPCS | Performed by: FAMILY MEDICINE

## 2018-06-28 PROCEDURE — 1123F ACP DISCUSS/DSCN MKR DOCD: CPT | Performed by: FAMILY MEDICINE

## 2018-06-28 PROCEDURE — G8417 CALC BMI ABV UP PARAM F/U: HCPCS | Performed by: FAMILY MEDICINE

## 2018-06-28 PROCEDURE — 4040F PNEUMOC VAC/ADMIN/RCVD: CPT | Performed by: FAMILY MEDICINE

## 2018-06-28 RX ORDER — ASPIRIN 81 MG/1
81 TABLET ORAL DAILY
Qty: 30 TABLET | Refills: 3 | Status: SHIPPED | OUTPATIENT
Start: 2018-06-28 | End: 2019-05-15 | Stop reason: ALTCHOICE

## 2018-06-28 NOTE — PROGRESS NOTES
[facility]  [address]  Dept: [phone]  Dept Fax: [phone]    Jayde Germain is a 81 y.o. female who presents today for her medical conditions/complaints as noted below. Jayde Germain is c/o of Other (the vein doctor  wants her to try medicine first for her vein narrowing, atorvastatin and aspirin. Also stted that she doens't feel well, when I asked her to elaborate she stated her eyes and her mind. She has seen the eye doctor already and he changed some eye drops, )      HPI:     HPI  Saw Dr. Hastings Officer. Had an appt with him but then saw the NP and he spent quite some time with her and he recommended her starting on a atorvastatin and aspirin. Has had trouble recently seeing things that are not there recently. When she was out with her daughter at a store and thought she saw a woman moving in front of her. Was sitting down when this happened. Does not feel safe. At her home sometimes will see things that are not there. Thinks maybe the dog was beside her moving but he is in the kennel. Has this almost every day at least once. Blood sugars have been better. The evening are good the am area good. Usually around 110or lower in the morning, at times lower than 100 at bedtime. Taking the novalog 20 at supper time. And the tresiba 50 at bedtime.     BP Readings from Last 3 Encounters:   06/28/18 124/80   04/16/18 110/80   12/11/17 128/84          (goal 120/80)    Wt Readings from Last 3 Encounters:   06/28/18 222 lb (100.7 kg)   04/16/18 222 lb (100.7 kg)   12/11/17 221 lb (100.2 kg)        Past Medical History:   Diagnosis Date    Anxiety     Carotid stenosis, bilateral     Cerebrovascular disease     Chronic kidney disease, stage 3     Emphysema of lung ([address]     Glaucoma     Hyperlipidemia     Hypertension     Pacemaker, artificial     Type II diabetes mellitus, uncontrolled ([address]       Past Surgical History: Swelling    Vasotec [Enalapril Maleate]     Vicodin [Hydrocodone-Acetaminophen]     Voltaren [Diclofenac Sodium]        Health Maintenance   Topic Date Due    DTaP/Tdap/Td vaccine (1 - Tdap) 09[birth year]20/1955    Shingles Vaccine (1 of 2 - 2 Dose Series) 09/20/1986    DEXA (modify frequency per FRAX score)  09/20/2001    Pneumococcal low/med risk (2 of 2 - PCV13) 07/23/2009    Flu vaccine (1) 09/01/2018    Potassium monitoring  03/02/2019    Creatinine monitoring  03/02/2019       Subjective:      Review of Systems   Constitutional: Positive for fatigue. Negative for activity change, appetite change, chills, diaphoresis and fever. Respiratory: Positive for shortness of breath (no change, mild long standing if she works hard). Negative for cough and chest tightness. Cardiovascular: Negative for chest pain, palpitations and leg swelling. Objective:     /80   Pulse 64   Wt 222 lb (100.7 kg)   BMI 34.26 kg/m²     Physical Exam   Constitutional: She is oriented to person, place, and time. She appears well-developed and well-nourished. HENT:   Head: Normocephalic and atraumatic. Right Ear: Tympanic membrane and ear canal normal.   Left Ear: Tympanic membrane and ear canal normal.   Eyes: Conjunctivae and EOM are normal.   Neck: Normal range of motion. Neck supple. No JVD present. No thyromegaly present. Cardiovascular: Normal rate, S1 normal and S2 normal.  An irregularly irregular rhythm present. Exam reveals no gallop, no friction rub and no decreased pulses. No murmur heard. Pulses:       Carotid pulses are 1+ on the left side. Dorsalis pedis pulses are 1+ on the right side, and 1+ on the left side. Pulmonary/Chest: Effort normal and breath sounds normal. No respiratory distress. Abdominal: Soft. Musculoskeletal: She exhibits no edema. Lymphadenopathy:     She has no cervical adenopathy. Neurological: She is alert and oriented to person, place, and time.    Skin: Skin is warm.   Psychiatric: She has a normal mood and affect. Her behavior is normal. Judgment and thought content normal.   Nursing note and vitals reviewed. Assessment/Plan:      Diagnosis Orders   1. Carotid stenosis, bilateral  aspirin EC 81 MG EC tablet    31671 - [place] Duplex Scan Extracranial, Clive- no change in her medications at this time from our office. 2. Type 2 diabetes mellitus with stage 1 chronic kidney disease, with long-term current use of insulin (HCC)  insulin aspart (NOVOLOG FLEXPEN) 100 UNIT/ML injection pen- Decrease the evening dose of the novolog in light of the low sugars    31275 - [place] Duplex Scan Extracranial, Clive   3. Cerebrovascular disease  Suspect this could be contributing to her visual issues. Check the dopplers and consider adding in a low dose of risperadol if this becomes problematic. 4. Peripheral vascular disease with claudication [facility]  [facility] [place]- add the meds per vascular and go from there   5. Abnormality of gait and mobility  [facility] [place][note 2], do think this would help   6. Visual hallucinations  Monitor these closely           Lab Results   Component Value Date    WBC 7.3 03/02/2018    HGB 14.3 03/02/2018    HCT 42.7 03/02/2018     03/02/2018     03/02/2018    K 4.5 03/02/2018     03/02/2018    CREATININE 1.36 (H) 03/02/2018    BUN 29 (H) 03/02/2018    CO2 28 03/02/2018    TSH 0.775 03/02/2018    LABA1C 7.9 04/16/2018    LABA1C 8.5 12/11/2017    LABA1C 8.4 09/06/2017       Return in about 4 weeks (around 7/26/2018). Patient given educational materials - see patient instructions. Discussed use, benefit, and side effects of prescribed medications. All patient questions answered. Pt voiced understanding. Reviewed health maintenance. Instructed to continue current medications, diet and exercise. Patient agreed with treatment plan. Follow up as directed.      Electronically signed by Lawrence Nam

## 2018-07-06 ENCOUNTER — TELEPHONE (OUTPATIENT)
Dept: FAMILY MEDICINE CLINIC | Age: 83
End: 2018-07-06

## 2018-07-06 NOTE — TELEPHONE ENCOUNTER
States I saw the vein doctor 1 week ago, he started me on Cilostazol BID, I am also taking a baby aspirin in every evening. I was having really good BS numbers but my last few PM numbers have been 359, 298, 259, 296 AM numbers 120, 149, 130, 129. When I lay down at night I have some back & chest quivering. Per AMM advised patient to give it the weekend and to call the office on Monday to let us know how she is doing. Please advise if any other recommendations.

## 2018-07-06 NOTE — TELEPHONE ENCOUNTER
Hold on the cilostazol over the weekend see how she is doing on Monday and if she is doing better then try it again but only in the morning-- once daily to ease into it.

## 2018-07-08 ASSESSMENT — ENCOUNTER SYMPTOMS
COUGH: 0
SHORTNESS OF BREATH: 1
CHEST TIGHTNESS: 0

## 2018-07-09 ENCOUNTER — TELEPHONE (OUTPATIENT)
Dept: FAMILY MEDICINE CLINIC | Age: 83
End: 2018-07-09

## 2018-07-09 NOTE — TELEPHONE ENCOUNTER
Roxanne wanted to let you know that her blood sugars are finally getting back to where they should be.

## 2018-07-12 ENCOUNTER — TELEPHONE (OUTPATIENT)
Dept: FAMILY MEDICINE CLINIC | Age: 83
End: 2018-07-12

## 2018-07-13 NOTE — TELEPHONE ENCOUNTER
Verify she is still taking the cholesterol medication (atorvastain) and the aspirin and have her continue on these.

## 2018-07-13 NOTE — TELEPHONE ENCOUNTER
See if we can get a report from the vascular referral-- I do not have any thing that documents what they recommended just Piedad telling me. Before I send in a script I would like to verify.  Can also check with the pharmacy and see if they have a script on file

## 2018-07-19 NOTE — TELEPHONE ENCOUNTER
Elise Hathaway is calling to request a refill on the following medication(s):  Requested Prescriptions     Pending Prescriptions Disp Refills    Insulin Pen Needle 31G X 5 MM MISC 300 each 3     Si each by Does not apply route 2 times daily       Last Visit Date (If Applicable):      Next Visit Date:    2018

## 2018-08-15 LAB
AGE FOR GFR: 81
ALT SERPL-CCNC: 22 UNITS/L
ANION GAP SERPL CALCULATED.3IONS-SCNC: 10 MMOL/L
AST SERPL-CCNC: 15 UNITS/L
BUN BLDV-MCNC: 38 MG/DL
CALCIUM SERPL-MCNC: 9 MG/DL
CHLORIDE BLD-SCNC: 105 MMOL/L
CHOLESTEROL/HDL RATIO: 3.1 RATIO
CHOLESTEROL: 136 MG/DL
CO2: 29 MMOL/L
CREAT SERPL-MCNC: 1.4 MG/DL
EGFR BF: 44 ML/MIN/1.73 M2
EGFR BM: 59 ML/MIN/1.73 M2
EGFR WF: 36 ML/MIN/1.73 M2
EGFR WM: 49 ML/MIN/1.73 M2
GLUCOSE: 137 MG/DL
HDL, DIRECT: 44 MG/DL
LDL CHOLESTEROL CALCULATED: 69.2 MG/DL
POTASSIUM SERPL-SCNC: 4.7 MMOL/L
SODIUM BLD-SCNC: 139 MMOL/L
TRIGL SERPL-MCNC: 114 MG/DL
VLDLC SERPL CALC-MCNC: 23 MG/DL

## 2018-08-20 ENCOUNTER — OFFICE VISIT (OUTPATIENT)
Dept: FAMILY MEDICINE CLINIC | Age: 83
End: 2018-08-20
Payer: MEDICARE

## 2018-08-20 VITALS
BODY MASS INDEX: 34.65 KG/M2 | WEIGHT: 224.56 LBS | OXYGEN SATURATION: 98 % | HEART RATE: 71 BPM | DIASTOLIC BLOOD PRESSURE: 82 MMHG | SYSTOLIC BLOOD PRESSURE: 120 MMHG

## 2018-08-20 DIAGNOSIS — Z79.4 TYPE 2 DIABETES MELLITUS WITH DIABETIC NEPHROPATHY, WITH LONG-TERM CURRENT USE OF INSULIN (HCC): Primary | ICD-10-CM

## 2018-08-20 DIAGNOSIS — I25.10 CORONARY ARTERY DISEASE INVOLVING NATIVE HEART, ANGINA PRESENCE UNSPECIFIED, UNSPECIFIED VESSEL OR LESION TYPE: ICD-10-CM

## 2018-08-20 DIAGNOSIS — N18.30 STAGE 3 CHRONIC KIDNEY DISEASE (HCC): ICD-10-CM

## 2018-08-20 DIAGNOSIS — I48.20 CHRONIC ATRIAL FIBRILLATION (HCC): ICD-10-CM

## 2018-08-20 DIAGNOSIS — N10 ACUTE PYELONEPHRITIS: ICD-10-CM

## 2018-08-20 DIAGNOSIS — E11.21 TYPE 2 DIABETES MELLITUS WITH DIABETIC NEPHROPATHY, WITH LONG-TERM CURRENT USE OF INSULIN (HCC): Primary | ICD-10-CM

## 2018-08-20 LAB — HBA1C MFR BLD: 8 %

## 2018-08-20 PROCEDURE — G8598 ASA/ANTIPLAT THER USED: HCPCS | Performed by: FAMILY MEDICINE

## 2018-08-20 PROCEDURE — 1101F PT FALLS ASSESS-DOCD LE1/YR: CPT | Performed by: FAMILY MEDICINE

## 2018-08-20 PROCEDURE — 4040F PNEUMOC VAC/ADMIN/RCVD: CPT | Performed by: FAMILY MEDICINE

## 2018-08-20 PROCEDURE — G8417 CALC BMI ABV UP PARAM F/U: HCPCS | Performed by: FAMILY MEDICINE

## 2018-08-20 PROCEDURE — 1090F PRES/ABSN URINE INCON ASSESS: CPT | Performed by: FAMILY MEDICINE

## 2018-08-20 PROCEDURE — G8427 DOCREV CUR MEDS BY ELIG CLIN: HCPCS | Performed by: FAMILY MEDICINE

## 2018-08-20 PROCEDURE — 83036 HEMOGLOBIN GLYCOSYLATED A1C: CPT | Performed by: FAMILY MEDICINE

## 2018-08-20 PROCEDURE — 99214 OFFICE O/P EST MOD 30 MIN: CPT | Performed by: FAMILY MEDICINE

## 2018-08-20 PROCEDURE — 1036F TOBACCO NON-USER: CPT | Performed by: FAMILY MEDICINE

## 2018-08-20 PROCEDURE — 1123F ACP DISCUSS/DSCN MKR DOCD: CPT | Performed by: FAMILY MEDICINE

## 2018-08-20 PROCEDURE — G8400 PT W/DXA NO RESULTS DOC: HCPCS | Performed by: FAMILY MEDICINE

## 2018-08-20 ASSESSMENT — ENCOUNTER SYMPTOMS
DIARRHEA: 0
CONSTIPATION: 0
COUGH: 1
ABDOMINAL PAIN: 0
SHORTNESS OF BREATH: 1
WHEEZING: 0

## 2018-08-20 NOTE — PROGRESS NOTES
[facility]  [address]  Dept: [phone]  Dept Fax: [phone]    Derek Delatorre is a 81 y.o. female who presents today for her medical conditions/complaints as noted below. Derek Delatorre is c/o of Diabetes; Hypertension; and Hyperlipidemia      HPI:     HPI   Derek Delatorre is a 81 y.o. female who presents for follow-up of hypertension, diabetes, and hyperlipidemia. She indicates that she is feeling well and denies any symptoms referable to her elevated blood pressure or diabetes. Specifically denies chest pain, palpitations, dyspnea, peripheral edema, thirst, frequent urination, and blurred vision. Current medication regimen is as listed below. She denies any side effects of medication, and has been taking it regularly. Home glucose readings have been variable- 130-170. Checks blood sugars 2 ties daily and occasionally a bit higher. Diabetic complications includeretinopathy, nephropathy, neuropathy and CAD. shehas been exercising regularly trying to walk occasionally. Hasbeen following a diabetic diet, some but not all the time. Has been 30 years. Did see podiatry and vascular. Tried the pletal but caused chest and back pain for lower leg pain-  Now not on anything,  Blood sugars were up on the pletal but better now. Has not made her therapy appt. Was in the ER on 8/4 for pyelonephritis. Treated with IV levaquin and then po as well as macrobid. Has been doing much better since then. Has not had the burning thay she had before. Has not had the other sx she had with urinary tract infection. States I havent had any testing done since I was at the ER to see if my kidney infection was clear. I thought that this was a good enough appt. Thinks doing better, denies anymore pain. Sometimes it feels like a ball in there but not any pain. I see a kidney doctor coming up this fall.       BP Readings from Last 3 Encounters:

## 2018-08-20 NOTE — PROGRESS NOTES
Review of Systems   Respiratory: Positive for cough ( states i have a cough all of the time ) and shortness of breath ( on occasion). Negative for wheezing. Cardiovascular: Positive for leg swelling. Negative for chest pain and palpitations. Started a new medication - pletal gave me heart palpitations, no longer taking and wasn't given anything else to take in its place. hasnt had any palpitations since. Gastrointestinal: Negative for abdominal pain, constipation and diarrhea. Genitourinary: Negative for frequency and urgency. Musculoskeletal: Positive for joint pain and myalgias. States yeah some but that's not my biggest complain. Neurological: Positive for headaches (headache before the kidney infection. ). Negative for dizziness. States I havent had any testing done since I was at the ER to see if my kidney infection was clear. I thought that this was a good enough appt. Thinks doing better, denies anymore pain. Sometimes it feels like a ball in there but not any pain. I see a kidney doctor coming up this fall.

## 2018-08-28 ENCOUNTER — TELEPHONE (OUTPATIENT)
Dept: FAMILY MEDICINE CLINIC | Age: 83
End: 2018-08-28

## 2018-08-28 NOTE — TELEPHONE ENCOUNTER
States I have a sore under my nose, I have been trying some things like bacitracin to help and its not. She gave me something when this happened a few years ago and it really helped. Is there anyway that I could have that again?

## 2018-08-29 NOTE — TELEPHONE ENCOUNTER
Can patient come to walk ins or work in-- cannot find the record of the visit so has been over 2 years.

## 2018-08-30 ENCOUNTER — OFFICE VISIT (OUTPATIENT)
Dept: FAMILY MEDICINE CLINIC | Age: 83
End: 2018-08-30
Payer: MEDICARE

## 2018-08-30 VITALS
WEIGHT: 225 LBS | HEART RATE: 66 BPM | SYSTOLIC BLOOD PRESSURE: 112 MMHG | BODY MASS INDEX: 34.72 KG/M2 | DIASTOLIC BLOOD PRESSURE: 62 MMHG

## 2018-08-30 DIAGNOSIS — M25.512 CHRONIC PAIN OF BOTH SHOULDERS: ICD-10-CM

## 2018-08-30 DIAGNOSIS — J30.89 NON-SEASONAL ALLERGIC RHINITIS, UNSPECIFIED TRIGGER: ICD-10-CM

## 2018-08-30 DIAGNOSIS — L01.00 IMPETIGO: Primary | ICD-10-CM

## 2018-08-30 DIAGNOSIS — M25.511 CHRONIC PAIN OF BOTH SHOULDERS: ICD-10-CM

## 2018-08-30 DIAGNOSIS — G89.29 CHRONIC PAIN OF BOTH SHOULDERS: ICD-10-CM

## 2018-08-30 PROCEDURE — 1123F ACP DISCUSS/DSCN MKR DOCD: CPT | Performed by: FAMILY MEDICINE

## 2018-08-30 PROCEDURE — 1036F TOBACCO NON-USER: CPT | Performed by: FAMILY MEDICINE

## 2018-08-30 PROCEDURE — G8400 PT W/DXA NO RESULTS DOC: HCPCS | Performed by: FAMILY MEDICINE

## 2018-08-30 PROCEDURE — 99213 OFFICE O/P EST LOW 20 MIN: CPT | Performed by: FAMILY MEDICINE

## 2018-08-30 PROCEDURE — 1101F PT FALLS ASSESS-DOCD LE1/YR: CPT | Performed by: FAMILY MEDICINE

## 2018-08-30 PROCEDURE — G8427 DOCREV CUR MEDS BY ELIG CLIN: HCPCS | Performed by: FAMILY MEDICINE

## 2018-08-30 PROCEDURE — 1090F PRES/ABSN URINE INCON ASSESS: CPT | Performed by: FAMILY MEDICINE

## 2018-08-30 PROCEDURE — G8598 ASA/ANTIPLAT THER USED: HCPCS | Performed by: FAMILY MEDICINE

## 2018-08-30 PROCEDURE — 4040F PNEUMOC VAC/ADMIN/RCVD: CPT | Performed by: FAMILY MEDICINE

## 2018-08-30 PROCEDURE — G8417 CALC BMI ABV UP PARAM F/U: HCPCS | Performed by: FAMILY MEDICINE

## 2018-08-30 RX ORDER — LORATADINE 10 MG/1
10 TABLET ORAL DAILY
Qty: 30 TABLET | Refills: 5 | Status: SHIPPED | OUTPATIENT
Start: 2018-08-30 | End: 2018-08-30 | Stop reason: SDUPTHER

## 2018-08-30 RX ORDER — LORATADINE 10 MG/1
10 TABLET ORAL DAILY
Qty: 30 TABLET | Refills: 5 | Status: SHIPPED | OUTPATIENT
Start: 2018-08-30 | End: 2019-04-03 | Stop reason: ALTCHOICE

## 2018-08-30 NOTE — PROGRESS NOTES
[address] [place] [address] [address]  Dept: [phone]  Dept Fax: [phone]    Sunnie Harada is a 81 y.o. female who presents today for her medical conditions/complaints as noted below. Sunnie Harada is c/o of Rash (under nose,eptum area cracks and burns)      HPI:     HPI  Has had a a drippy nose for weeks, aggravating and now her nose is very sore on the bottom of it. HAs not really tried anything for the drop from her nose. HAs all the time but is worse in the fall usually and after she eats, not sure what to try for this. No fever, no chills, no ST, no cough. Has been using the bacitracin and this seemed to help the pain but not the drip. Took a \"allergy capsule\" with 3 colors on it at home but it made her heart race. This dried the drip yesterday. As chronic ongoing pain in her shoulders for a long time. No known injury.  No swelling- wonders wonders what to do with this    BP Readings from Last 3 Encounters:   08/30/18 112/62   08/20/18 120/82   06/28/18 124/80          (goal 120/80)    Wt Readings from Last 3 Encounters:   08/30/18 225 lb (102.1 kg)   08/20/18 224 lb 9 oz (101.9 kg)   06/28/18 222 lb (100.7 kg)        Past Medical History:   Diagnosis Date    Anxiety     Carotid stenosis, bilateral     Cerebrovascular disease     Chronic kidney disease, stage 3     Emphysema of lung (HCC)     Glaucoma     Hyperlipidemia     Hypertension     Pacemaker, artificial     Type II diabetes mellitus, uncontrolled ([address]       Past Surgical History:   Procedure Laterality Date    CHOLECYSTECTOMY      COLONOSCOPY      EYE SURGERY      HYSTERECTOMY      PACEMAKER INSERTION         Family History   Problem Relation Age of Onset    Diabetes Mother     Heart Disease Father     Lung Cancer Sister        Social History   Substance Use Topics    Smoking status: Never Smoker    Smokeless tobacco: Never Used    Alcohol use No 08/15/2018    CO2 29 08/15/2018    TSH 0.775 03/02/2018    LABA1C 8.0 08/20/2018    LABA1C 7.9 04/16/2018    LABA1C 8.5 12/11/2017       Return for As scheduled. Patient given educational materials - see patient instructions. Discussed use, benefit, and side effects of prescribed medications. All patient questions answered. Pt voiced understanding. Reviewed health maintenance. Instructed to continue current medications, diet and exercise. Patient agreed with treatment plan. Follow up as directed.      Electronically signed by Young Celeste MD on 9/2/2018

## 2018-09-02 ASSESSMENT — ENCOUNTER SYMPTOMS
SHORTNESS OF BREATH: 0
WHEEZING: 0
CHEST TIGHTNESS: 0

## 2018-09-19 ENCOUNTER — TELEPHONE (OUTPATIENT)
Dept: FAMILY MEDICINE CLINIC | Age: 83
End: 2018-09-19

## 2018-09-19 DIAGNOSIS — L01.00 IMPETIGO: Primary | ICD-10-CM

## 2018-09-19 RX ORDER — DOXYCYCLINE HYCLATE 100 MG
100 TABLET ORAL 2 TIMES DAILY
Qty: 14 TABLET | Refills: 0 | Status: SHIPPED | OUTPATIENT
Start: 2018-09-19 | End: 2018-10-12 | Stop reason: SDUPTHER

## 2018-09-26 PROBLEM — J32.9 SINUSITIS: Status: RESOLVED | Noted: 2017-05-02 | Resolved: 2018-09-26

## 2018-10-10 ENCOUNTER — TELEPHONE (OUTPATIENT)
Dept: FAMILY MEDICINE CLINIC | Age: 83
End: 2018-10-10

## 2018-10-12 ENCOUNTER — OFFICE VISIT (OUTPATIENT)
Dept: FAMILY MEDICINE CLINIC | Age: 83
End: 2018-10-12
Payer: MEDICARE

## 2018-10-12 VITALS
BODY MASS INDEX: 35.23 KG/M2 | OXYGEN SATURATION: 95 % | WEIGHT: 228.31 LBS | SYSTOLIC BLOOD PRESSURE: 114 MMHG | HEART RATE: 70 BPM | DIASTOLIC BLOOD PRESSURE: 76 MMHG

## 2018-10-12 DIAGNOSIS — Z79.4 TYPE 2 DIABETES MELLITUS WITH STAGE 1 CHRONIC KIDNEY DISEASE, WITH LONG-TERM CURRENT USE OF INSULIN (HCC): ICD-10-CM

## 2018-10-12 DIAGNOSIS — N18.1 TYPE 2 DIABETES MELLITUS WITH STAGE 1 CHRONIC KIDNEY DISEASE, WITH LONG-TERM CURRENT USE OF INSULIN (HCC): ICD-10-CM

## 2018-10-12 DIAGNOSIS — J34.0 CELLULITIS OF NOSTRIL: Primary | ICD-10-CM

## 2018-10-12 DIAGNOSIS — L01.00 IMPETIGO: ICD-10-CM

## 2018-10-12 DIAGNOSIS — E11.22 TYPE 2 DIABETES MELLITUS WITH STAGE 1 CHRONIC KIDNEY DISEASE, WITH LONG-TERM CURRENT USE OF INSULIN (HCC): ICD-10-CM

## 2018-10-12 LAB
AEROBIC CULTURE: NORMAL
MISCELLANEOUS LAB TEST RESULT: NORMAL

## 2018-10-12 PROCEDURE — 87070 CULTURE OTHR SPECIMN AEROBIC: CPT | Performed by: FAMILY MEDICINE

## 2018-10-12 PROCEDURE — G8417 CALC BMI ABV UP PARAM F/U: HCPCS | Performed by: FAMILY MEDICINE

## 2018-10-12 PROCEDURE — G8400 PT W/DXA NO RESULTS DOC: HCPCS | Performed by: FAMILY MEDICINE

## 2018-10-12 PROCEDURE — 1036F TOBACCO NON-USER: CPT | Performed by: FAMILY MEDICINE

## 2018-10-12 PROCEDURE — G8427 DOCREV CUR MEDS BY ELIG CLIN: HCPCS | Performed by: FAMILY MEDICINE

## 2018-10-12 PROCEDURE — 1123F ACP DISCUSS/DSCN MKR DOCD: CPT | Performed by: FAMILY MEDICINE

## 2018-10-12 PROCEDURE — G8598 ASA/ANTIPLAT THER USED: HCPCS | Performed by: FAMILY MEDICINE

## 2018-10-12 PROCEDURE — 87101 SKIN FUNGI CULTURE: CPT | Performed by: FAMILY MEDICINE

## 2018-10-12 PROCEDURE — 4040F PNEUMOC VAC/ADMIN/RCVD: CPT | Performed by: FAMILY MEDICINE

## 2018-10-12 PROCEDURE — G8484 FLU IMMUNIZE NO ADMIN: HCPCS | Performed by: FAMILY MEDICINE

## 2018-10-12 PROCEDURE — 1101F PT FALLS ASSESS-DOCD LE1/YR: CPT | Performed by: FAMILY MEDICINE

## 2018-10-12 PROCEDURE — 99213 OFFICE O/P EST LOW 20 MIN: CPT | Performed by: FAMILY MEDICINE

## 2018-10-12 PROCEDURE — 1090F PRES/ABSN URINE INCON ASSESS: CPT | Performed by: FAMILY MEDICINE

## 2018-10-12 RX ORDER — DOXYCYCLINE HYCLATE 100 MG
100 TABLET ORAL 2 TIMES DAILY
Qty: 20 TABLET | Refills: 0 | Status: SHIPPED | OUTPATIENT
Start: 2018-10-12 | End: 2018-10-13 | Stop reason: SDUPTHER

## 2018-10-13 RX ORDER — DOXYCYCLINE HYCLATE 100 MG
100 TABLET ORAL 2 TIMES DAILY
Qty: 20 TABLET | Refills: 0 | Status: SHIPPED | OUTPATIENT
Start: 2018-10-13 | End: 2018-10-23

## 2018-10-19 DIAGNOSIS — J34.0 CELLULITIS OF NOSTRIL: ICD-10-CM

## 2018-12-17 ENCOUNTER — OFFICE VISIT (OUTPATIENT)
Dept: FAMILY MEDICINE CLINIC | Age: 83
End: 2018-12-17
Payer: MEDICARE

## 2018-12-17 VITALS
HEART RATE: 76 BPM | OXYGEN SATURATION: 97 % | BODY MASS INDEX: 35.18 KG/M2 | DIASTOLIC BLOOD PRESSURE: 68 MMHG | SYSTOLIC BLOOD PRESSURE: 114 MMHG | WEIGHT: 228 LBS

## 2018-12-17 DIAGNOSIS — I48.20 CHRONIC ATRIAL FIBRILLATION (HCC): ICD-10-CM

## 2018-12-17 DIAGNOSIS — N18.30 STAGE 3 CHRONIC KIDNEY DISEASE (HCC): ICD-10-CM

## 2018-12-17 DIAGNOSIS — I65.23 CAROTID STENOSIS, BILATERAL: ICD-10-CM

## 2018-12-17 DIAGNOSIS — I25.10 CORONARY ARTERY DISEASE INVOLVING NATIVE HEART, ANGINA PRESENCE UNSPECIFIED, UNSPECIFIED VESSEL OR LESION TYPE: ICD-10-CM

## 2018-12-17 DIAGNOSIS — Z13.31 POSITIVE DEPRESSION SCREENING: ICD-10-CM

## 2018-12-17 DIAGNOSIS — M25.512 CHRONIC LEFT SHOULDER PAIN: ICD-10-CM

## 2018-12-17 DIAGNOSIS — I73.9 PERIPHERAL VASCULAR DISEASE OF LOWER EXTREMITY (HCC): ICD-10-CM

## 2018-12-17 DIAGNOSIS — G89.29 CHRONIC LEFT SHOULDER PAIN: ICD-10-CM

## 2018-12-17 DIAGNOSIS — Z79.4 TYPE 2 DIABETES MELLITUS WITH DIABETIC NEPHROPATHY, WITH LONG-TERM CURRENT USE OF INSULIN (HCC): Primary | ICD-10-CM

## 2018-12-17 DIAGNOSIS — E11.21 TYPE 2 DIABETES MELLITUS WITH DIABETIC NEPHROPATHY, WITH LONG-TERM CURRENT USE OF INSULIN (HCC): Primary | ICD-10-CM

## 2018-12-17 LAB — HBA1C MFR BLD: 7.9 %

## 2018-12-17 PROCEDURE — 1090F PRES/ABSN URINE INCON ASSESS: CPT | Performed by: FAMILY MEDICINE

## 2018-12-17 PROCEDURE — G8427 DOCREV CUR MEDS BY ELIG CLIN: HCPCS | Performed by: FAMILY MEDICINE

## 2018-12-17 PROCEDURE — G8417 CALC BMI ABV UP PARAM F/U: HCPCS | Performed by: FAMILY MEDICINE

## 2018-12-17 PROCEDURE — G8400 PT W/DXA NO RESULTS DOC: HCPCS | Performed by: FAMILY MEDICINE

## 2018-12-17 PROCEDURE — 4040F PNEUMOC VAC/ADMIN/RCVD: CPT | Performed by: FAMILY MEDICINE

## 2018-12-17 PROCEDURE — G0444 DEPRESSION SCREEN ANNUAL: HCPCS | Performed by: FAMILY MEDICINE

## 2018-12-17 PROCEDURE — G8431 POS CLIN DEPRES SCRN F/U DOC: HCPCS | Performed by: FAMILY MEDICINE

## 2018-12-17 PROCEDURE — 83036 HEMOGLOBIN GLYCOSYLATED A1C: CPT | Performed by: FAMILY MEDICINE

## 2018-12-17 PROCEDURE — 1101F PT FALLS ASSESS-DOCD LE1/YR: CPT | Performed by: FAMILY MEDICINE

## 2018-12-17 PROCEDURE — G8598 ASA/ANTIPLAT THER USED: HCPCS | Performed by: FAMILY MEDICINE

## 2018-12-17 PROCEDURE — 99214 OFFICE O/P EST MOD 30 MIN: CPT | Performed by: FAMILY MEDICINE

## 2018-12-17 PROCEDURE — 1036F TOBACCO NON-USER: CPT | Performed by: FAMILY MEDICINE

## 2018-12-17 PROCEDURE — 1123F ACP DISCUSS/DSCN MKR DOCD: CPT | Performed by: FAMILY MEDICINE

## 2018-12-17 PROCEDURE — G8484 FLU IMMUNIZE NO ADMIN: HCPCS | Performed by: FAMILY MEDICINE

## 2018-12-17 RX ORDER — AZELASTINE HCL 205.5 UG/1
SPRAY NASAL
COMMUNITY
End: 2020-09-10

## 2018-12-17 ASSESSMENT — ENCOUNTER SYMPTOMS
ABDOMINAL PAIN: 1
CONSTIPATION: 1
WHEEZING: 0
DIARRHEA: 1
SHORTNESS OF BREATH: 0
COUGH: 1

## 2018-12-17 ASSESSMENT — PATIENT HEALTH QUESTIONNAIRE - PHQ9
10. IF YOU CHECKED OFF ANY PROBLEMS, HOW DIFFICULT HAVE THESE PROBLEMS MADE IT FOR YOU TO DO YOUR WORK, TAKE CARE OF THINGS AT HOME, OR GET ALONG WITH OTHER PEOPLE: 1
SUM OF ALL RESPONSES TO PHQ QUESTIONS 1-9: 14
SUM OF ALL RESPONSES TO PHQ9 QUESTIONS 1 & 2: 6
6. FEELING BAD ABOUT YOURSELF - OR THAT YOU ARE A FAILURE OR HAVE LET YOURSELF OR YOUR FAMILY DOWN: 0
7. TROUBLE CONCENTRATING ON THINGS, SUCH AS READING THE NEWSPAPER OR WATCHING TELEVISION: 1
1. LITTLE INTEREST OR PLEASURE IN DOING THINGS: 3
3. TROUBLE FALLING OR STAYING ASLEEP: 1
8. MOVING OR SPEAKING SO SLOWLY THAT OTHER PEOPLE COULD HAVE NOTICED. OR THE OPPOSITE, BEING SO FIGETY OR RESTLESS THAT YOU HAVE BEEN MOVING AROUND A LOT MORE THAN USUAL: 0
5. POOR APPETITE OR OVEREATING: 3
SUM OF ALL RESPONSES TO PHQ QUESTIONS 1-9: 14
2. FEELING DOWN, DEPRESSED OR HOPELESS: 3
9. THOUGHTS THAT YOU WOULD BE BETTER OFF DEAD, OR OF HURTING YOURSELF: 0
4. FEELING TIRED OR HAVING LITTLE ENERGY: 3

## 2018-12-17 NOTE — PROGRESS NOTES
the left arm. Has ached since then. Comes and goes. When she tries to lift her arm she feels it. Even sitting in the chair will ache at times, not waking her up at night.       BP Readings from Last 3 Encounters:   12/17/18 114/68   10/12/18 114/76   08/30/18 112/62          (goal 120/80)    Wt Readings from Last 3 Encounters:   12/17/18 228 lb (103.4 kg)   10/12/18 228 lb 5 oz (103.6 kg)   08/30/18 225 lb (102.1 kg)        Past Medical History:   Diagnosis Date    Anxiety     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     Glaucoma     Hyperlipidemia     Hypertension     Pacemaker, artificial     Peripheral vascular disease of lower extremity ([facility] 12/17/2018    Type II diabetes mellitus, uncontrolled ([facility]       Past Surgical History:   Procedure Laterality Date    CHOLECYSTECTOMY      COLONOSCOPY      EYE SURGERY      HYSTERECTOMY      PACEMAKER INSERTION         Family History   Problem Relation Age of Onset    Diabetes Mother     Heart Disease Father     Lung Cancer Sister        Social History   Substance Use Topics    Smoking status: Never Smoker    Smokeless tobacco: Never Used    Alcohol use No      Current Outpatient Prescriptions   Medication Sig Dispense Refill    azelastine HCl 0.15 % SOLN azelastine 0.15 % (205.5 mcg) nasal spray      diltiazem (CARDIZEM LA) 120 MG TB24 extended release tablet Take 120 mg by mouth daily      insulin aspart (NOVOLOG FLEXPEN) 100 UNIT/ML injection pen 15 units before biggest meal and also sliding scale 5 pen 5    loratadine (CLARITIN) 10 MG tablet Take 1 tablet by mouth daily 30 tablet 5    Insulin Pen Needle 31G X 5 MM MISC 1 each by Does not apply route 2 times daily 300 each 3    aspirin EC 81 MG EC tablet Take 1 tablet by mouth daily 30 tablet 3    DULoxetine (CYMBALTA) 60 MG extended release capsule TAKE 1 CAPSULE DAILY 90 capsule 3    BYSTOLIC 5 MG tablet

## 2019-01-09 ENCOUNTER — TELEPHONE (OUTPATIENT)
Dept: FAMILY MEDICINE CLINIC | Age: 84
End: 2019-01-09

## 2019-03-22 ENCOUNTER — OFFICE VISIT (OUTPATIENT)
Dept: FAMILY MEDICINE CLINIC | Age: 84
End: 2019-03-22
Payer: MEDICARE

## 2019-03-22 VITALS
DIASTOLIC BLOOD PRESSURE: 82 MMHG | OXYGEN SATURATION: 94 % | SYSTOLIC BLOOD PRESSURE: 130 MMHG | BODY MASS INDEX: 36.2 KG/M2 | HEART RATE: 98 BPM | WEIGHT: 234.6 LBS

## 2019-03-22 DIAGNOSIS — I48.20 CHRONIC ATRIAL FIBRILLATION (HCC): ICD-10-CM

## 2019-03-22 DIAGNOSIS — N18.30 STAGE 3 CHRONIC KIDNEY DISEASE (HCC): ICD-10-CM

## 2019-03-22 DIAGNOSIS — I73.9 PERIPHERAL VASCULAR DISEASE WITH CLAUDICATION (HCC): ICD-10-CM

## 2019-03-22 DIAGNOSIS — I73.9 PERIPHERAL VASCULAR DISEASE OF LOWER EXTREMITY (HCC): ICD-10-CM

## 2019-03-22 DIAGNOSIS — R60.0 LOCALIZED EDEMA: ICD-10-CM

## 2019-03-22 DIAGNOSIS — I50.23 ACUTE ON CHRONIC SYSTOLIC CONGESTIVE HEART FAILURE (HCC): Primary | ICD-10-CM

## 2019-03-22 PROCEDURE — G8427 DOCREV CUR MEDS BY ELIG CLIN: HCPCS | Performed by: FAMILY MEDICINE

## 2019-03-22 PROCEDURE — G8417 CALC BMI ABV UP PARAM F/U: HCPCS | Performed by: FAMILY MEDICINE

## 2019-03-22 PROCEDURE — 99214 OFFICE O/P EST MOD 30 MIN: CPT | Performed by: FAMILY MEDICINE

## 2019-03-22 PROCEDURE — 1090F PRES/ABSN URINE INCON ASSESS: CPT | Performed by: FAMILY MEDICINE

## 2019-03-22 PROCEDURE — 1123F ACP DISCUSS/DSCN MKR DOCD: CPT | Performed by: FAMILY MEDICINE

## 2019-03-22 PROCEDURE — 1101F PT FALLS ASSESS-DOCD LE1/YR: CPT | Performed by: FAMILY MEDICINE

## 2019-03-22 PROCEDURE — 1036F TOBACCO NON-USER: CPT | Performed by: FAMILY MEDICINE

## 2019-03-22 PROCEDURE — 4040F PNEUMOC VAC/ADMIN/RCVD: CPT | Performed by: FAMILY MEDICINE

## 2019-03-22 PROCEDURE — G8599 NO ASA/ANTIPLAT THER USE RNG: HCPCS | Performed by: FAMILY MEDICINE

## 2019-03-22 PROCEDURE — G8484 FLU IMMUNIZE NO ADMIN: HCPCS | Performed by: FAMILY MEDICINE

## 2019-03-22 PROCEDURE — G8400 PT W/DXA NO RESULTS DOC: HCPCS | Performed by: FAMILY MEDICINE

## 2019-03-22 RX ORDER — FUROSEMIDE 20 MG/1
20 TABLET ORAL DAILY
Qty: 45 TABLET | Refills: 11
Start: 2019-03-22 | End: 2019-04-03 | Stop reason: SDUPTHER

## 2019-03-25 ENCOUNTER — TELEPHONE (OUTPATIENT)
Dept: FAMILY MEDICINE CLINIC | Age: 84
End: 2019-03-25

## 2019-03-26 RX ORDER — NEBIVOLOL 5 MG/1
10 TABLET ORAL DAILY
Qty: 90 TABLET | Refills: 3
Start: 2019-03-26 | End: 2019-03-27 | Stop reason: DRUGHIGH

## 2019-03-27 LAB
AGE FOR GFR: 82
ANION GAP SERPL CALCULATED.3IONS-SCNC: 11 MMOL/L
B-TYPE NATRIURETIC PEPTIDE: 1100 PG/ML (ref 0–100)
BUN BLDV-MCNC: 32 MG/DL (ref 7–17)
CHLORIDE BLD-SCNC: 101 MMOL/L (ref 98–120)
CO2: 28 MMOL/L (ref 22–31)
CREAT SERPL-MCNC: 1.3 MG/DL (ref 0.5–1)
EGFR BF: 47 ML/MIN/1.73 M2
EGFR BM: 64 ML/MIN/1.73 M2
EGFR WF: 39 ML/MIN/1.73 M2
EGFR WM: 53 ML/MIN/1.73 M2
POTASSIUM SERPL-SCNC: 5.1 MMOL/L (ref 3.6–5)
SODIUM BLD-SCNC: 135 MMOL/L (ref 135–145)

## 2019-03-27 RX ORDER — NEBIVOLOL 5 MG/1
5 TABLET ORAL DAILY
Qty: 90 TABLET | Refills: 1
Start: 2019-03-27 | End: 2019-08-22

## 2019-03-28 ENCOUNTER — OFFICE VISIT (OUTPATIENT)
Dept: FAMILY MEDICINE CLINIC | Age: 84
End: 2019-03-28
Payer: MEDICARE

## 2019-03-28 VITALS
OXYGEN SATURATION: 94 % | DIASTOLIC BLOOD PRESSURE: 82 MMHG | HEART RATE: 60 BPM | SYSTOLIC BLOOD PRESSURE: 128 MMHG | BODY MASS INDEX: 36.34 KG/M2 | WEIGHT: 235.5 LBS

## 2019-03-28 DIAGNOSIS — I10 ESSENTIAL HYPERTENSION: ICD-10-CM

## 2019-03-28 DIAGNOSIS — M54.9 ACUTE UPPER BACK PAIN: ICD-10-CM

## 2019-03-28 DIAGNOSIS — N18.30 STAGE 3 CHRONIC KIDNEY DISEASE (HCC): ICD-10-CM

## 2019-03-28 DIAGNOSIS — Z95.0 PACEMAKER, ARTIFICIAL: ICD-10-CM

## 2019-03-28 DIAGNOSIS — E11.21 TYPE 2 DIABETES MELLITUS WITH DIABETIC NEPHROPATHY, WITH LONG-TERM CURRENT USE OF INSULIN (HCC): ICD-10-CM

## 2019-03-28 DIAGNOSIS — Z79.4 TYPE 2 DIABETES MELLITUS WITH DIABETIC NEPHROPATHY, WITH LONG-TERM CURRENT USE OF INSULIN (HCC): ICD-10-CM

## 2019-03-28 DIAGNOSIS — I48.20 CHRONIC ATRIAL FIBRILLATION (HCC): ICD-10-CM

## 2019-03-28 DIAGNOSIS — R05.9 COUGH: ICD-10-CM

## 2019-03-28 DIAGNOSIS — I50.23 ACUTE ON CHRONIC SYSTOLIC CONGESTIVE HEART FAILURE (HCC): Primary | ICD-10-CM

## 2019-03-28 LAB
AGE FOR GFR: 82
ANION GAP SERPL CALCULATED.3IONS-SCNC: 11 MMOL/L
B-TYPE NATRIURETIC PEPTIDE: 2150 PG/ML (ref 0–100)
BASOPHILS # BLD: 0.03 THOU/MM3 (ref 0–0.3)
BUN BLDV-MCNC: 34 MG/DL (ref 7–17)
CHLORIDE BLD-SCNC: 102 MMOL/L (ref 98–120)
CO2: 28 MMOL/L (ref 22–31)
CREAT SERPL-MCNC: 1.3 MG/DL (ref 0.5–1)
D DIMER: <245 NG/ML (ref 0–245)
DIFFERENTIAL: AUTOMATED DIFF
EGFR BF: 47 ML/MIN/1.73 M2
EGFR BM: 64 ML/MIN/1.73 M2
EGFR WF: 39 ML/MIN/1.73 M2
EGFR WM: 53 ML/MIN/1.73 M2
EOSINOPHIL # BLD: 0.03 THOU/MM3 (ref 0–1.1)
FLU A ANTIGEN: POSITIVE
FLU B ANTIGEN: NEGATIVE
HCT VFR BLD CALC: 43.8 % (ref 37–47)
HEMOGLOBIN: 13.7 G/DL (ref 12–16)
LYMPHOCYTES # BLD: 1 THOU/MM3 (ref 1–5.5)
MAGNESIUM: 2 MG/DL (ref 1.6–2.3)
MCH RBC QN AUTO: 30 PG (ref 28.5–32)
MCHC RBC AUTO-ENTMCNC: 31.4 G/DL (ref 32–37)
MCV RBC AUTO: 95.5 FL (ref 80–94)
MONOCYTES # BLD: 0.61 THOU/MM3 (ref 0.1–1)
NEUTROPHILS: 2.91 THOU/MM3 (ref 2–8.1)
PDW BLD-RTO: 12.9 % (ref 8.5–15.5)
PLATELET # BLD: 183 THOU/MM3 (ref 130–400)
PMV BLD AUTO: 7.6 FL (ref 7.4–11)
POTASSIUM SERPL-SCNC: 4.6 MMOL/L (ref 3.6–5)
RBC # BLD: 4.59 M/UL (ref 4.2–5.4)
SODIUM BLD-SCNC: 136 MMOL/L (ref 135–145)
WBC # BLD: 4.57 THOU/ML3 (ref 4.8–10)

## 2019-03-29 LAB
AGE FOR GFR: 82
ANION GAP SERPL CALCULATED.3IONS-SCNC: 11 MMOL/L
BUN BLDV-MCNC: 43 MG/DL (ref 7–17)
CHLORIDE BLD-SCNC: 102 MMOL/L (ref 98–120)
CO2: 27 MMOL/L (ref 22–31)
CREAT SERPL-MCNC: 1.7 MG/DL (ref 0.5–1)
EGFR BF: 35 ML/MIN/1.73 M2
EGFR BM: 47 ML/MIN/1.73 M2
EGFR WF: 29 ML/MIN/1.73 M2
EGFR WM: 39 ML/MIN/1.73 M2
POTASSIUM SERPL-SCNC: 4.3 MMOL/L (ref 3.6–5)
SODIUM BLD-SCNC: 136 MMOL/L (ref 135–145)

## 2019-03-30 LAB
AGE FOR GFR: 82
ANION GAP SERPL CALCULATED.3IONS-SCNC: 11 MMOL/L
B-TYPE NATRIURETIC PEPTIDE: 1230 PG/ML (ref 0–100)
BUN BLDV-MCNC: 44 MG/DL (ref 7–17)
CHLORIDE BLD-SCNC: 106 MMOL/L (ref 98–120)
CO2: 25 MMOL/L (ref 22–31)
CREAT SERPL-MCNC: 1.6 MG/DL (ref 0.5–1)
EGFR BF: 37 ML/MIN/1.73 M2
EGFR BM: 50 ML/MIN/1.73 M2
EGFR WF: 31 ML/MIN/1.73 M2
EGFR WM: 42 ML/MIN/1.73 M2
POTASSIUM SERPL-SCNC: 3.9 MMOL/L (ref 3.6–5)
SODIUM BLD-SCNC: 138 MMOL/L (ref 135–145)

## 2019-04-01 ENCOUNTER — TELEPHONE (OUTPATIENT)
Dept: FAMILY MEDICINE CLINIC | Age: 84
End: 2019-04-01

## 2019-04-01 NOTE — TELEPHONE ENCOUNTER
[facility] Trans[facility] Initial Follow Up Call    Outreach made within 2 business days of discharge: Yes    Patient: Fabiano Manzanares Patient : 1935   MRN: [id number]  Reason for Admission: Flu   Discharge Date:  3/30/3019       Spoke with: Sierra Brush, feels weak and still coughing but not as bad  Discharge department/facility: [facility]    TCM Interactive Patient Contact:  Was patient able to fill all prescriptions: Yes  Was patient instructed to bring all medications to the follow-up visit: Yes  Is patient taking all medications as directed in the discharge summary?  Yes  Does patient understand their discharge instructions: Yes  Does patient have questions or concerns that need addressed prior to 7-14 day follow up office visit: no    Scheduled appointment with PCP within 7-14 days    Follow Up  Future Appointments   Date Time Provider Lorraine Zelaya   4/3/2019  3:00 PM Pepe Rutledge MD [facility]   2019 10:45 AM Pepe Rutledge MD [facility]       Jasmyn Wilkerson LPN

## 2019-04-03 ENCOUNTER — OFFICE VISIT (OUTPATIENT)
Dept: FAMILY MEDICINE CLINIC | Age: 84
End: 2019-04-03
Payer: MEDICARE

## 2019-04-03 VITALS
BODY MASS INDEX: 35.86 KG/M2 | OXYGEN SATURATION: 95 % | HEART RATE: 77 BPM | DIASTOLIC BLOOD PRESSURE: 78 MMHG | SYSTOLIC BLOOD PRESSURE: 118 MMHG | WEIGHT: 232.38 LBS

## 2019-04-03 DIAGNOSIS — J10.1 INFLUENZA A: ICD-10-CM

## 2019-04-03 DIAGNOSIS — I50.42 CHRONIC COMBINED SYSTOLIC AND DIASTOLIC CONGESTIVE HEART FAILURE (HCC): ICD-10-CM

## 2019-04-03 DIAGNOSIS — E11.21 TYPE 2 DIABETES MELLITUS WITH DIABETIC NEPHROPATHY, WITH LONG-TERM CURRENT USE OF INSULIN (HCC): ICD-10-CM

## 2019-04-03 DIAGNOSIS — I48.20 CHRONIC ATRIAL FIBRILLATION (HCC): ICD-10-CM

## 2019-04-03 DIAGNOSIS — R60.0 LOCALIZED EDEMA: ICD-10-CM

## 2019-04-03 DIAGNOSIS — Z79.4 TYPE 2 DIABETES MELLITUS WITH DIABETIC NEPHROPATHY, WITH LONG-TERM CURRENT USE OF INSULIN (HCC): ICD-10-CM

## 2019-04-03 LAB
AGE FOR GFR: 82
ANION GAP SERPL CALCULATED.3IONS-SCNC: 14 MMOL/L
B-TYPE NATRIURETIC PEPTIDE: 1980 PG/ML (ref 0–100)
BUN BLDV-MCNC: 38 MG/DL (ref 7–17)
CHLORIDE BLD-SCNC: 101 MMOL/L (ref 98–120)
CO2: 26 MMOL/L (ref 22–31)
CREAT SERPL-MCNC: 1.3 MG/DL (ref 0.5–1)
EGFR BF: 47 ML/MIN/1.73 M2
EGFR BM: 64 ML/MIN/1.73 M2
EGFR WF: 39 ML/MIN/1.73 M2
EGFR WM: 53 ML/MIN/1.73 M2
POTASSIUM SERPL-SCNC: 4.8 MMOL/L (ref 3.6–5)
SODIUM BLD-SCNC: 136 MMOL/L (ref 135–145)

## 2019-04-03 PROCEDURE — 1111F DSCHRG MED/CURRENT MED MERGE: CPT | Performed by: FAMILY MEDICINE

## 2019-04-03 PROCEDURE — 99496 TRANSJ CARE MGMT HIGH F2F 7D: CPT | Performed by: FAMILY MEDICINE

## 2019-04-03 RX ORDER — FUROSEMIDE 20 MG/1
40 TABLET ORAL DAILY
Qty: 180 TABLET | Refills: 3 | Status: SHIPPED | OUTPATIENT
Start: 2019-04-03 | End: 2019-07-05 | Stop reason: ALTCHOICE

## 2019-04-03 RX ORDER — CETIRIZINE HYDROCHLORIDE 10 MG/1
10 TABLET ORAL DAILY
Qty: 90 TABLET | Refills: 1
Start: 2019-04-03 | End: 2020-09-10

## 2019-04-03 NOTE — PROGRESS NOTES
Post-Discharge Transitional Care Management Services or Hospital Follow Up      Josh Newsome   YOB: 1935    Date of Office Visit:  4/3/2019  Date of Hospital Admission: 3/28  Date of Hospital Discharge: 3/30    Care management risk score Rising risk (score 2-5) and Complex Care (Scores >=6): 4     Non face to face  following discharge, date last encounter closed (first attempt may have been earlier): 4/1/2019  2:41 PM     Call initiated 2 business days of discharge: Yes    Patient Active Problem List   Diagnosis    Anxiety    Chronic kidney disease    Coronary artery disease    Chronic atrial fibrillation ([facility] [facility]    Chronic fatigue    Irritable bowel    Cerebrovascular disease    Hypertension    Hyperlipidemia    Type 2 diabetes mellitus with diabetic nephropathy, with long-term current use of insulin ([facility] [address]    Carotid stenosis, bilateral    Pacemaker, artificial    Peripheral vascular disease of lower extremity (HCC)       Allergies   Allergen Reactions    Pletal [Cilostazol]      Chest quivers and increased blood sugars    Ammonium Hydroxide     Avandia [Rosiglitazone]     Cephalexin     Codeine     Feldene [Piroxicam]     Levaquin [Levofloxacin In D5w]      Sweating, irritable.  Lexapro [Escitalopram]     Metoprolol     Metronidazole     Penicillins     Pilocarpine Hydrochloride [Pilocarpine]      Opthalmic solution.  Gives headache      Propoxyphene      Darvocet    Qvar [Beclomethasone] Swelling    Vasotec [Enalapril Maleate]     Vicodin [Hydrocodone-Acetaminophen]     Voltaren [Diclofenac Sodium]        Medications listed as ordered at the [facility]     Medications marked \"taking\" at this time  Outpatient Medications Marked as Taking for the 4/3/19 encounter (Office Visit) with Georgiana Manzanares MD   Medication Sig Dispense Refill    Insulin Degludec (TRESIBA FLEXTOUCH) 100 UNIT/ML SOPN Inject 25 Units into the skin daily Indications: taking 25 units daily 5 pen 5    furosemide (LASIX) 20 MG tablet Take 2 tablets by mouth daily Indications: taking two tabs daily 180 tablet 3    cetirizine (ZYRTEC) 10 MG tablet Take 1 tablet by mouth daily 90 tablet 1    nebivolol (BYSTOLIC) 5 MG tablet Take 1 tablet by mouth daily 90 tablet 1    azelastine HCl 0.15 % SOLN azelastine 0.15 % (205.5 mcg) nasal spray      insulin aspart (NOVOLOG FLEXPEN) 100 UNIT/ML injection pen 15 units before biggest meal and also sliding scale 5 pen 5    Insulin Pen Needle 31G X 5 MM MISC 1 each by Does not apply route 2 times daily 300 each 3    aspirin EC 81 MG EC tablet Take 1 tablet by mouth daily 30 tablet 3    DULoxetine (CYMBALTA) 60 MG extended release capsule TAKE 1 CAPSULE DAILY 90 capsule 3    Handicap Placard MISC by Does not apply route The disability is expected to last lifetime  Dx: knee pain 1 each 0    apixaban (ELIQUIS) 2.5 MG TABS tablet Take 1 tablet by mouth 2 times daily 180 tablet 3    timolol (TIMOPTIC) 0.5 % ophthalmic solution Place 1 drop into both eyes 2 times daily  0    vitamin B-12 (CYANOCOBALAMIN) 1000 MCG tablet Take 1,000 mcg by mouth daily      psyllium (KONSYL) 28.3 % PACK Take 1 packet by mouth daily      melatonin 3 MG TABS tablet Take 3 mg by mouth nightly as needed           Medications patient taking as of now reconciled against medications ordered at time of hospital discharge: Yes    Chief Complaint   Patient presents with    Follow-Up from [facility]  3/28-3/30 in the hospital for CHF and tested positive for influenza a. states I am lousy, days arent good, but nights are worse. coughing alot which is causing pain in my back and my ribs. has no energy. feels rattly and wheezy. i am short of breath i even have to stop when getting dressed or undressed i cant do anything without stopping. Admitted to [facility] with CHF and influenza. Treated from Thursday to Sat for the influenza and CHF. Started on Tamiflu and IV diuresis. Also had significan LE edema complicated with the PVD. Had A fib with RVR when she was admitted and this lead to the fluid overload as well. Inpatient course: Discharge summary reviewed- see chart. Interval history/Current status: Since she has been home has been somewhat weaker, feeling better overall however. Cough is not gone but is better. Has coughing jags that persist.  Has not been feeling at all like she was having any palpitations. Sugars have been good since she has been home. No dizziness, no CP, SOB is improved significantly. Vitals:    04/03/19 1537   BP: 118/78   Pulse: 77   SpO2: 95%   Weight: 232 lb 6 oz (105.4 kg)     Body mass index is 35.86 kg/m². Wt Readings from Last 3 Encounters:   04/03/19 232 lb 6 oz (105.4 kg)   03/28/19 235 lb 8 oz (106.8 kg)   03/22/19 234 lb 9.6 oz (106.4 kg)     BP Readings from Last 3 Encounters:   04/03/19 118/78   03/28/19 128/82   03/22/19 130/82       Review of Systems    Physical Exam   Constitutional: She is oriented to person, place, and time. She appears well-developed and well-nourished. HENT:   Head: Normocephalic. Right Ear: External ear normal.   Left Ear: External ear normal.   Nose: Nose normal.   Mouth/Throat: Oropharynx is clear and moist. No oropharyngeal exudate. Eyes: Pupils are equal, round, and reactive to light. Conjunctivae and EOM are normal.   Neck: Normal range of motion. Neck supple. No thyromegaly present. Cardiovascular:   Murmur heard. Irregular irregular rhythm with murmur at the LSB, bradycardic   Pulmonary/Chest: Effort normal. No respiratory distress. She has no rales. Bases diminished   Abdominal: Soft. She exhibits no distension. There is no tenderness. Musculoskeletal: She exhibits edema (trace edema bilaterally, cool to touch bilaterally).    Still with mild tenderness in the mid thoracic back but improved from admission   Neurological: She is alert and oriented to person, place, and time. No cranial nerve deficit. Skin: Capillary refill takes less than 2 seconds. Psychiatric: She has a normal mood and affect. Her behavior is normal. Judgment and thought content normal.   Nursing note and vitals reviewed. Assessment/Plan:  1. Influenza A  Improved, finished the tamiflu- no further treatment needed  - DE DISCHARGE MEDS RECONCILED W/ CURRENT OUTPATIENT MED LIST    2. Type 2 diabetes mellitus with diabetic nephropathy, with long-term current use of insulin (HCC)  Continue on the current dose -- the lower 25 units at this time  - Insulin Degludec (TRESIBA FLEXTOUCH) 100 UNIT/ML SOPN; Inject 25 Units into the skin daily Indications: taking 25 units daily  Dispense: 5 pen; Refill: 5    3. Localized edema  Continue with the higher dose as tolerating and needs the higher diuretics today  - furosemide (LASIX) 20 MG tablet; Take 2 tablets by mouth daily Indications: taking two tabs daily  Dispense: 180 tablet; Refill: 3    4. Chronic atrial fibrillation (HCC)  Rate much better controlled. Will check the echo in light of the recent CHF exacerbation but suspect it was rate related with the influenza illness  - ECHO Complete 2D W Doppler W Color; Future  - DE DISCHARGE MEDS RECONCILED W/ CURRENT OUTPATIENT MED LIST    5. Chronic combined systolic and diastolic congestive heart failure (HCC)    - ECHO Complete 2D W Doppler W Color;  Future  - DE DISCHARGE MEDS RECONCILED W/ CURRENT OUTPATIENT MED LIST        Medical Decision Making: high complexity

## 2019-04-10 ENCOUNTER — TELEPHONE (OUTPATIENT)
Dept: FAMILY MEDICINE CLINIC | Age: 84
End: 2019-04-10

## 2019-04-10 DIAGNOSIS — Z79.4 TYPE 2 DIABETES MELLITUS WITH DIABETIC NEPHROPATHY, WITH LONG-TERM CURRENT USE OF INSULIN (HCC): ICD-10-CM

## 2019-04-10 DIAGNOSIS — E11.21 TYPE 2 DIABETES MELLITUS WITH DIABETIC NEPHROPATHY, WITH LONG-TERM CURRENT USE OF INSULIN (HCC): ICD-10-CM

## 2019-04-17 ENCOUNTER — TELEPHONE (OUTPATIENT)
Dept: FAMILY MEDICINE CLINIC | Age: 84
End: 2019-04-17

## 2019-04-17 ENCOUNTER — OFFICE VISIT (OUTPATIENT)
Dept: FAMILY MEDICINE CLINIC | Age: 84
End: 2019-04-17
Payer: MEDICARE

## 2019-04-17 VITALS
HEART RATE: 80 BPM | SYSTOLIC BLOOD PRESSURE: 120 MMHG | BODY MASS INDEX: 35.49 KG/M2 | RESPIRATION RATE: 24 BRPM | WEIGHT: 230 LBS | DIASTOLIC BLOOD PRESSURE: 62 MMHG

## 2019-04-17 DIAGNOSIS — B02.9 HERPES ZOSTER WITHOUT COMPLICATION: Primary | ICD-10-CM

## 2019-04-17 PROCEDURE — 1036F TOBACCO NON-USER: CPT | Performed by: NURSE PRACTITIONER

## 2019-04-17 PROCEDURE — G8599 NO ASA/ANTIPLAT THER USE RNG: HCPCS | Performed by: NURSE PRACTITIONER

## 2019-04-17 PROCEDURE — G8400 PT W/DXA NO RESULTS DOC: HCPCS | Performed by: NURSE PRACTITIONER

## 2019-04-17 PROCEDURE — 1090F PRES/ABSN URINE INCON ASSESS: CPT | Performed by: NURSE PRACTITIONER

## 2019-04-17 PROCEDURE — G8417 CALC BMI ABV UP PARAM F/U: HCPCS | Performed by: NURSE PRACTITIONER

## 2019-04-17 PROCEDURE — G8510 SCR DEP NEG, NO PLAN REQD: HCPCS | Performed by: NURSE PRACTITIONER

## 2019-04-17 PROCEDURE — 1123F ACP DISCUSS/DSCN MKR DOCD: CPT | Performed by: NURSE PRACTITIONER

## 2019-04-17 PROCEDURE — 99213 OFFICE O/P EST LOW 20 MIN: CPT | Performed by: NURSE PRACTITIONER

## 2019-04-17 PROCEDURE — 4040F PNEUMOC VAC/ADMIN/RCVD: CPT | Performed by: NURSE PRACTITIONER

## 2019-04-17 PROCEDURE — G8427 DOCREV CUR MEDS BY ELIG CLIN: HCPCS | Performed by: NURSE PRACTITIONER

## 2019-04-17 RX ORDER — VALACYCLOVIR HYDROCHLORIDE 1 G/1
1000 TABLET, FILM COATED ORAL 3 TIMES DAILY
Qty: 21 TABLET | Refills: 0 | Status: SHIPPED | OUTPATIENT
Start: 2019-04-17 | End: 2019-04-24

## 2019-04-17 ASSESSMENT — PATIENT HEALTH QUESTIONNAIRE - PHQ9
2. FEELING DOWN, DEPRESSED OR HOPELESS: 0
SUM OF ALL RESPONSES TO PHQ QUESTIONS 1-9: 0
1. LITTLE INTEREST OR PLEASURE IN DOING THINGS: 0
SUM OF ALL RESPONSES TO PHQ9 QUESTIONS 1 & 2: 0
SUM OF ALL RESPONSES TO PHQ QUESTIONS 1-9: 0

## 2019-04-17 ASSESSMENT — ENCOUNTER SYMPTOMS
ABDOMINAL PAIN: 0
BOWEL INCONTINENCE: 0
BACK PAIN: 1

## 2019-04-17 NOTE — PATIENT INSTRUCTIONS
Valtrex 3 times a day for 7 days. Stop in 3 days if there is no rash or pain. Follow up with primary care provider in 1 to 2 days. Patient Education        Shingles: Care Instructions  Your Care Instructions    Shingles (herpes zoster) causes pain and a blistered rash. The rash can appear anywhere on the body but will be on only one side of the body, the left or right. It will be in a band, a strip, or a small area. The pain can be very severe. Shingles can also cause tingling or itching in the area of the rash. The blisters scab over after a few days and heal in 2 to 4 weeks. Medicines can help you feel better and may help prevent more serious problems caused by shingles. Shingles is caused by the same virus that causes chickenpox. When you have chickenpox, the virus gets into your nerve roots and stays there (becomes dormant) long after you get over the chickenpox. If the virus becomes active again, it can cause shingles. Follow-up care is a key part of your treatment and safety. Be sure to make and go to all appointments, and call your doctor if you are having problems. It's also a good idea to know your test results and keep a list of the medicines you take. How can you care for yourself at home? · Be safe with medicines. Take your medicines exactly as prescribed. Call your doctor if you think you are having a problem with your medicine. Antiviral medicine helps you get better faster. · Try not to scratch or pick at the blisters. They will crust over and fall off on their own if you leave them alone. · Put cool, wet cloths on the area to relieve pain and itching. You can also use calamine lotion. Try not to use so much lotion that it cakes and is hard to get off. · Put cornstarch or baking soda on the sores to help dry them out so they heal faster. · Do not use thick ointment, such as petroleum jelly, on the sores. This will keep them from drying and healing.   · To help remove loose crusts, soak

## 2019-04-17 NOTE — TELEPHONE ENCOUNTER
Appt in June with Huong Voss, Patient should see if she can move that up to see if they want to adjust her meds at all based on the SOB and swelling with the echo.

## 2019-04-17 NOTE — PROGRESS NOTES
[address], APRN-CNP  [address]  Phone:  [phone]  Fax:  [phone]  Josefine Gottron is a 81 y.o. female who presents today for her medical conditions/complaints as noted below. Josefine Gottron c/o of Flank Pain (right side back pain. Hx of reccurrent kidney infections. \" I follow a kidney doctor\")      HPI:     Back Pain   This is a new problem. The current episode started today. The problem occurs constantly. The problem is unchanged. The pain is present in the thoracic spine. The quality of the pain is described as burning, stabbing and aching. The pain does not radiate. The pain is at a severity of 6/10. Exacerbated by: touch. Pertinent negatives include no abdominal pain, bladder incontinence, bowel incontinence or leg pain. Risk factors include sedentary lifestyle. She has tried nothing for the symptoms.        Wt Readings from Last 3 Encounters:   04/17/19 230 lb (104.3 kg)   04/03/19 232 lb 6 oz (105.4 kg)   03/28/19 235 lb 8 oz (106.8 kg)       Temp Readings from Last 3 Encounters:   12/11/17 96.9 °F (36.1 °C) (Tympanic)       BP Readings from Last 3 Encounters:   04/17/19 120/62   04/03/19 118/78   03/28/19 128/82       Pulse Readings from Last 3 Encounters:   04/17/19 80   04/03/19 77   03/28/19 60              Past Medical History:   Diagnosis Date    Anxiety     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 ([address]     Glaucoma     Hyperlipidemia     Hypertension     Pacemaker, artificial     Peripheral vascular disease of lower extremity ([address] 12/17/2018    Type II diabetes mellitus, uncontrolled ([address]       Past Surgical History:   Procedure Laterality Date    CHOLECYSTECTOMY      COLONOSCOPY      EYE SURGERY      HYSTERECTOMY      PACEMAKER INSERTION       Family History   Problem Relation Age of Onset    Diabetes Mother     Heart Disease Other reaction(s): Dizziness    Ammonium Hydroxide     Amoxicillin     Avandia [Rosiglitazone]     Benzalkonium Chloride     Bifidobacterium     Brimonidine Tartrate     Cephalexin     Codeine     Diclofenac     Dorzolamide     Etodolac     Feldene [Piroxicam]     Lactobacillus     Levaquin [Levofloxacin In D5w]      Sweating, irritable.  Lexapro [Escitalopram]     Metoprolol     Metronidazole     Pantoprazole     Penicillins     Pilocarpine Hydrochloride [Pilocarpine]      Opthalmic solution. Gives headache      Prednisone     Propoxyphene      Darvocet    Qvar [Beclomethasone] Swelling    Streptococci     Vasotec [Enalapril Maleate]     Vicodin [Hydrocodone-Acetaminophen]     Voltaren [Diclofenac Sodium]        No exam data present    Subjective:      Review of Systems   Gastrointestinal: Negative for abdominal pain and bowel incontinence. Genitourinary: Negative for bladder incontinence. Musculoskeletal: Positive for back pain. Objective:     /62   Pulse 80   Resp 24   Wt 230 lb (104.3 kg)   BMI 35.49 kg/m²     Physical Exam   Constitutional: She is oriented to person, place, and time. Vital signs are normal. She appears well-developed and well-nourished. She has a sickly appearance. No distress. HENT:   Head: Normocephalic. Right Ear: External ear normal.   Left Ear: External ear normal.   Nose: Nose normal.   Mouth/Throat: Oropharynx is clear and moist. No oropharyngeal exudate. Eyes: Conjunctivae and EOM are normal. Right eye exhibits no discharge. Left eye exhibits no discharge. No scleral icterus. Neck: Normal range of motion. Neck supple. Cardiovascular: Normal rate. An irregular rhythm present. Pulmonary/Chest: Effort normal and breath sounds normal. No respiratory distress. Musculoskeletal: Normal range of motion. Right shoulder: She exhibits pain. Arms:  Neurological: She is alert and oriented to person, place, and time. Skin: Skin is warm, dry and intact. Capillary refill takes less than 2 seconds. She is not diaphoretic. Psychiatric: She has a normal mood and affect. Her speech is normal and behavior is normal. Judgment and thought content normal. Cognition and memory are normal.   Nursing note and vitals reviewed. Assessment:      Diagnosis Orders   1. Herpes zoster without complication  valACYclovir (VALTREX) 1 g tablet     No results found for this visit on 04/17/19. Plan:     Valtrex 3 times a day for 7 days. Stop in 3 days if there is no rash or pain. Follow up with primary care provider in 1 to 2 days. Patient Instructions     Valtrex 3 times a day for 7 days. Stop in 3 days if there is no rash or pain. Follow up with primary care provider in 1 to 2 days. Patient Education        Shingles: Care Instructions  Your Care Instructions    Shingles (herpes zoster) causes pain and a blistered rash. The rash can appear anywhere on the body but will be on only one side of the body, the left or right. It will be in a band, a strip, or a small area. The pain can be very severe. Shingles can also cause tingling or itching in the area of the rash. The blisters scab over after a few days and heal in 2 to 4 weeks. Medicines can help you feel better and may help prevent more serious problems caused by shingles. Shingles is caused by the same virus that causes chickenpox. When you have chickenpox, the virus gets into your nerve roots and stays there (becomes dormant) long after you get over the chickenpox. If the virus becomes active again, it can cause shingles. Follow-up care is a key part of your treatment and safety. Be sure to make and go to all appointments, and call your doctor if you are having problems. It's also a good idea to know your test results and keep a list of the medicines you take. How can you care for yourself at home? · Be safe with medicines.  Take your medicines exactly as prescribed. Call your doctor if you think you are having a problem with your medicine. Antiviral medicine helps you get better faster. · Try not to scratch or pick at the blisters. They will crust over and fall off on their own if you leave them alone. · Put cool, wet cloths on the area to relieve pain and itching. You can also use calamine lotion. Try not to use so much lotion that it cakes and is hard to get off. · Put cornstarch or baking soda on the sores to help dry them out so they heal faster. · Do not use thick ointment, such as petroleum jelly, on the sores. This will keep them from drying and healing. · To help remove loose crusts, soak them in tap water. This can help decrease oozing, and dry and soothe the skin. · Take an over-the-counter pain medicine, such as acetaminophen (Tylenol), ibuprofen (Advil, Motrin), or naproxen (Aleve). Read and follow all instructions on the label. · Avoid close contact with people until the blisters have healed. It is very important for you to avoid contact with anyone who has never had chickenpox or the chickenpox vaccine. Pregnant women, young babies, and anyone else who has a hard time fighting infection (such as someone with HIV, diabetes, or cancer) is especially at risk. When should you call for help? Call your doctor now or seek immediate medical care if:    · You have a new or higher fever.     · You have a severe headache and a stiff neck.     · You lose the ability to think clearly.     · The rash spreads to your forehead, nose, eyes, or eyelids.     · You have eye pain, or your vision gets worse.     · You have new pain in your face, or you cannot move the muscles in your face.     · Blisters spread to new parts of your body.    Watch closely for changes in your health, and be sure to contact your doctor if:    · The rash has not healed after 2 to 4 weeks.     · You still have pain after the rash has healed. Where can you learn more?   Go to [email or web address] and sign in to your [employer] account. Emely Elmore in the [facility] box to learn more about \"Shingles: Care Instructions. \"     If you do not have an account, please click on the \"Sign Up Now\" link. Current as of: July 30, 2018  Content Version: 11.9  © 5863-0133 [employer]. Care instructions adapted under license by [facility] ([facility]). If you have questions about a medical condition or this instruction, always ask your healthcare professional. [place] [address] any warranty or liability for your use of this information. Patient/Caregiver instructed on use, benefit, and side effects of prescribed medications. All patient/parent/caregiver questions answered. Patient/parent/caregiver voiced understanding. Reviewed health maintenance. Instructed to continue current medications, diet and exercise. Patient agreed with treatment plan. Follow up as directed.            Electronically signed by NISHANT Henley CNP on4/17/2019

## 2019-05-15 ENCOUNTER — OFFICE VISIT (OUTPATIENT)
Dept: FAMILY MEDICINE CLINIC | Age: 84
End: 2019-05-15
Payer: MEDICARE

## 2019-05-15 VITALS
HEART RATE: 76 BPM | WEIGHT: 221.7 LBS | SYSTOLIC BLOOD PRESSURE: 122 MMHG | DIASTOLIC BLOOD PRESSURE: 80 MMHG | BODY MASS INDEX: 34.21 KG/M2 | OXYGEN SATURATION: 94 %

## 2019-05-15 DIAGNOSIS — Z79.4 TYPE 2 DIABETES MELLITUS WITH DIABETIC NEPHROPATHY, WITH LONG-TERM CURRENT USE OF INSULIN (HCC): ICD-10-CM

## 2019-05-15 DIAGNOSIS — E11.21 TYPE 2 DIABETES MELLITUS WITH DIABETIC NEPHROPATHY, WITH LONG-TERM CURRENT USE OF INSULIN (HCC): ICD-10-CM

## 2019-05-15 DIAGNOSIS — I50.42 CHRONIC COMBINED SYSTOLIC AND DIASTOLIC CONGESTIVE HEART FAILURE (HCC): Primary | ICD-10-CM

## 2019-05-15 DIAGNOSIS — N18.1 TYPE 2 DIABETES MELLITUS WITH STAGE 1 CHRONIC KIDNEY DISEASE, WITH LONG-TERM CURRENT USE OF INSULIN (HCC): ICD-10-CM

## 2019-05-15 DIAGNOSIS — E11.22 TYPE 2 DIABETES MELLITUS WITH STAGE 1 CHRONIC KIDNEY DISEASE, WITH LONG-TERM CURRENT USE OF INSULIN (HCC): ICD-10-CM

## 2019-05-15 DIAGNOSIS — R05.9 COUGH: ICD-10-CM

## 2019-05-15 DIAGNOSIS — Z79.4 TYPE 2 DIABETES MELLITUS WITH STAGE 1 CHRONIC KIDNEY DISEASE, WITH LONG-TERM CURRENT USE OF INSULIN (HCC): ICD-10-CM

## 2019-05-15 PROCEDURE — 1123F ACP DISCUSS/DSCN MKR DOCD: CPT | Performed by: FAMILY MEDICINE

## 2019-05-15 PROCEDURE — G8427 DOCREV CUR MEDS BY ELIG CLIN: HCPCS | Performed by: FAMILY MEDICINE

## 2019-05-15 PROCEDURE — G8598 ASA/ANTIPLAT THER USED: HCPCS | Performed by: FAMILY MEDICINE

## 2019-05-15 PROCEDURE — G8400 PT W/DXA NO RESULTS DOC: HCPCS | Performed by: FAMILY MEDICINE

## 2019-05-15 PROCEDURE — 1090F PRES/ABSN URINE INCON ASSESS: CPT | Performed by: FAMILY MEDICINE

## 2019-05-15 PROCEDURE — 4040F PNEUMOC VAC/ADMIN/RCVD: CPT | Performed by: FAMILY MEDICINE

## 2019-05-15 PROCEDURE — 99214 OFFICE O/P EST MOD 30 MIN: CPT | Performed by: FAMILY MEDICINE

## 2019-05-15 PROCEDURE — 1036F TOBACCO NON-USER: CPT | Performed by: FAMILY MEDICINE

## 2019-05-15 PROCEDURE — G8417 CALC BMI ABV UP PARAM F/U: HCPCS | Performed by: FAMILY MEDICINE

## 2019-05-15 RX ORDER — LATANOPROST 50 UG/ML
1 SOLUTION/ DROPS OPHTHALMIC NIGHTLY
COMMUNITY
End: 2022-01-01

## 2019-05-15 RX ORDER — BENZONATATE 100 MG/1
100 CAPSULE ORAL 3 TIMES DAILY PRN
Qty: 30 CAPSULE | Refills: 0 | Status: SHIPPED | OUTPATIENT
Start: 2019-05-15 | End: 2019-05-22

## 2019-05-15 RX ORDER — CLOPIDOGREL BISULFATE 75 MG/1
75 TABLET ORAL DAILY
COMMUNITY
End: 2020-02-14 | Stop reason: SDUPTHER

## 2019-05-15 NOTE — PROGRESS NOTES
[address] [place] [address] [address]  Dept: [phone]  Dept XKK:[phone]    Soila Gan is a 81 y.o. female who presents today for her medical conditions/complaints as notedbelow. Soila Gan is c/o of 1 Month Follow-Up (6 week follow up. seems to be coughing again. sometimes my cough comes and goes. sometimes SOB states especially if i carry anything or go up a couple of steps. swelling in legs comes and goes. switched to taking bystolic to in the evenings and that has helped some with my fatigue, currently taking a whole pill instead of a half. )      HPI:     HPI    Saw the Vascular Dr-- he did the angioplasty-- in her right leg-- was itching all over at that time. Did have Some scratched open areas. Will still have some of this occasionally, no as often or as severe. Since this procedure she has been doing much better-- the legs are not getting her up as severely. Is supposed to get some testing on the left leg now also. Did start having a bit more of a cough again recently -- seemed like it has been about 3 weeks. No change in her SOB from what it was, maybe actually a bit less frequent. Dry cough, no mucous at all. Roof of her mouth gets sore, feels like can occasionally get some bright yellowish out/ sightly grey. More heartburn recently also. Blood sugars have been very good lately. None have been too high or too low really. No issues with her medications.     BP Readings from Last 3 Encounters:   05/15/19 122/80   04/17/19 120/62   04/03/19 118/78          (goal 120/80)    Wt Readings from Last 3 Encounters:   05/15/19 221 lb 11.2 oz (100.6 kg)   04/17/19 230 lb (104.3 kg)   04/03/19 232 lb 6 oz (105.4 kg)        Past Medical History:   Diagnosis Date    Anxiety     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 ([address]     Glaucoma     Hyperlipidemia     Hypertension     Pacemaker, artificial     Peripheral vascular disease of lower extremity ([facility] 12/17/2018    Type II diabetes mellitus, uncontrolled ([facility]       Past Surgical History:   Procedure Laterality Date    CHOLECYSTECTOMY      COLONOSCOPY      EYE SURGERY      HYSTERECTOMY      PACEMAKER INSERTION         Family History   Problem Relation Age of Onset    Diabetes Mother     Heart Disease Father     Lung Cancer Sister        Social History     Tobacco Use    Smoking status: Never Smoker    Smokeless tobacco: Never Used   Substance Use Topics    Alcohol use: No      Current Outpatient Medications   Medication Sig Dispense Refill    insulin aspart (NOVOLOG FLEXPEN) 100 UNIT/ML injection pen 15 units before biggest meal and also sliding scale 16 pen 3    latanoprost (XALATAN) 0.005 % ophthalmic solution 1 drop nightly      clopidogrel (PLAVIX) 75 MG tablet Take 75 mg by mouth daily      benzonatate (TESSALON) 100 MG capsule Take 1 capsule by mouth 3 times daily as needed for Cough 30 capsule 0    Insulin Degludec (TRESIBA FLEXTOUCH) 100 UNIT/ML SOPN Inject 40 Units into the skin daily Indications: taking 25 units daily 5 pen 5    furosemide (LASIX) 20 MG tablet Take 2 tablets by mouth daily Indications: taking two tabs daily 180 tablet 3    cetirizine (ZYRTEC) 10 MG tablet Take 1 tablet by mouth daily 90 tablet 1    nebivolol (BYSTOLIC) 5 MG tablet Take 1 tablet by mouth daily 90 tablet 1    azelastine HCl 0.15 % SOLN azelastine 0.15 % (205.5 mcg) nasal spray      Insulin Pen Needle 31G X 5 MM MISC 1 each by Does not apply route 2 times daily 300 each 3    DULoxetine (CYMBALTA) 60 MG extended release capsule TAKE 1 CAPSULE DAILY 90 capsule 3    Handicap Placard MISC by Does not apply route The disability is expected to last lifetime  Dx: knee pain 1 each 0    apixaban (ELIQUIS) 2.5 MG TABS tablet Take 1 tablet by mouth 2 times daily 180 tablet 3    vitamin B-12 (CYANOCOBALAMIN) 1000 MCG tablet Take 1,000 mcg by mouth daily      psyllium (KONSYL) 28.3 % PACK Take 1 packet by mouth daily      melatonin 3 MG TABS tablet Take 3 mg by mouth nightly as needed       timolol (TIMOPTIC) 0.5 % ophthalmic solution Place 1 drop into both eyes 2 times daily  0     No current facility-administered medications for this visit. Allergies   Allergen Reactions    Cilostazol      Chest quivers and increased blood sugars  Elevated blood sugar    Amitriptyline     Amlodipine      Other reaction(s): Dizziness    Ammonium Hydroxide     Amoxicillin     Avandia [Rosiglitazone]     Benzalkonium Chloride     Bifidobacterium     Brimonidine Tartrate     Cephalexin     Codeine     Diclofenac     Dorzolamide     Etodolac     Feldene [Piroxicam]     Lactobacillus     Levaquin [Levofloxacin In D5w]      Sweating, irritable.  Lexapro [Escitalopram]     Metoprolol     Metronidazole     Pantoprazole     Penicillins     Pilocarpine Hydrochloride [Pilocarpine]      Opthalmic solution. Gives headache      Prednisone     Propoxyphene      Darvocet    Qvar [Beclomethasone] Swelling    Streptococci     Vasotec [Enalapril Maleate]     Vicodin [Hydrocodone-Acetaminophen]     Voltaren [Diclofenac Sodium]        Health Maintenance   Topic Date Due    DTaP/Tdap/Td vaccine (1 - Tdap) 09/20/1955    Shingles Vaccine (1 of 2) 09/20/1986    DEXA (modify frequency per FRAX score)  09/20/2001    Pneumococcal 65+ years Vaccine (2 of 2 - PCV13) 07/23/2009    Flu vaccine (Season Ended) 09/01/2019    Potassium monitoring  05/17/2020    Creatinine monitoring  05/17/2020       Subjective:      Review of Systems    Objective:     /80   Pulse 76   Wt 221 lb 11.2 oz (100.6 kg)   SpO2 94%   BMI 34.21 kg/m²     Physical Exam   Constitutional: She is oriented to person, place, and time. She appears well-developed and well-nourished. HENT:   Head: Normocephalic.    Right Ear: External ear normal.   Left Ear: External ear normal.   Nose: Nose normal.   Mouth/Throat: Oropharynx is clear and moist. No oropharyngeal exudate. Eyes: Pupils are equal, round, and reactive to light. Conjunctivae and EOM are normal.   Neck: Normal range of motion. Neck supple. No thyromegaly present. Cardiovascular:   Murmur heard. Irregular irregular rhythm with murmur at the LSB, bradycardic   Pulmonary/Chest: Effort normal. No respiratory distress. She has no rales. Bases diminished   Abdominal: Soft. She exhibits no distension. There is no tenderness. Musculoskeletal: She exhibits edema (trace edema bilaterally, lower legs are both now WARM, right LE has a palp pedal pulse also). Neurological: She is alert and oriented to person, place, and time. No cranial nerve deficit. Skin: Capillary refill takes less than 2 seconds. No erythema. Psychiatric: She has a normal mood and affect. Her behavior is normal. Judgment and thought content normal.   Nursing note and vitals reviewed. Assessment/Plan:      Diagnosis Orders   1. Chronic combined systolic and diastolic congestive heart failure (HCC)  Basic Metabolic Panel-- appears euvolemic. No change in her current medications, will recheck the labs to see if she has improved    Brain Natriuretic Peptide   2. Type 2 diabetes mellitus with stage 1 chronic kidney disease, with long-term current use of insulin ([facility])  insulin aspart (NOVOLOG FLEXPEN) 100 UNIT/ML injection pen-- continue on her current dose of the insulin    Basic Metabolic Panel   3. Type 2 diabetes mellitus with diabetic nephropathy, with long-term current use of insulin ([facility])  Basic Metabolic Panel   4.  Cough  benzonatate (TESSALON) 100 MG capsule- lungs are clear, no evidence of persistent infection- will continue on the tessalon but would consider recheck of the CXR If sx increase     Take the ranitidine regularly and see if this helps with the cough and the throat tickle    Lab Results   Component Value Date    WBC 4.57 (L) 03/28/2019    HGB 13.7 03/28/2019    HCT 43.8 03/28/2019     03/28/2019    CHOL 136 08/15/2018    TRIG 114 08/15/2018    ALT 22 08/15/2018    AST 15 08/15/2018     05/17/2019    K 4.4 05/17/2019     05/17/2019    CREATININE 1.4 (H) 05/17/2019    BUN 39 (H) 05/17/2019    CO2 27 05/17/2019    TSH 0.775 03/02/2018    LABA1C 7.9 12/17/2018    LABA1C 8.0 08/20/2018    LABA1C 7.9 04/16/2018       Return in about 3 months (around 8/15/2019) for HTN, DM follow up. Patient given educational materials - see patientinstructions. Discussed use, benefit, and side effects of prescribed medications. All patient questions answered. Pt voiced understanding. Reviewed health maintenance. Instructed to continue current medications, diet andexercise. Patient agreed with treatment plan. Follow up as directed.      Electronically signed by Bruna Scott MD on 5/19/2019

## 2019-05-17 ENCOUNTER — TELEPHONE (OUTPATIENT)
Dept: FAMILY MEDICINE CLINIC | Age: 84
End: 2019-05-17

## 2019-05-17 LAB
AGE FOR GFR: 82
ANION GAP SERPL CALCULATED.3IONS-SCNC: 12 MMOL/L
B-TYPE NATRIURETIC PEPTIDE: 1310 PG/ML (ref 0–100)
BUN BLDV-MCNC: 39 MG/DL (ref 7–17)
CALCIUM SERPL-MCNC: 8.8 MG/DL (ref 8.4–10.2)
CHLORIDE BLD-SCNC: 103 MMOL/L (ref 98–120)
CO2: 27 MMOL/L (ref 22–31)
CREAT SERPL-MCNC: 1.4 MG/DL (ref 0.5–1)
EGFR BF: 44 ML/MIN/1.73 M2
EGFR BM: 59 ML/MIN/1.73 M2
EGFR WF: 36 ML/MIN/1.73 M2
EGFR WM: 49 ML/MIN/1.73 M2
GLUCOSE: 161 MG/DL (ref 65–105)
POTASSIUM SERPL-SCNC: 4.4 MMOL/L (ref 3.6–5)
SODIUM BLD-SCNC: 138 MMOL/L (ref 135–145)

## 2019-06-24 NOTE — TELEPHONE ENCOUNTER
Boris Laura is calling to request a refill on the following medication(s):  Requested Prescriptions     Pending Prescriptions Disp Refills    DULoxetine (CYMBALTA) 60 MG extended release capsule [Pharmacy Med Name: DULOXETINE CAP 60MG DR] 90 capsule 3     Sig: TAKE 1 CAPSULE DAILY       Last Visit Date (If Applicable):  8/37/4339    Next Visit Date:    8/15/2019

## 2019-06-25 RX ORDER — DULOXETIN HYDROCHLORIDE 60 MG/1
CAPSULE, DELAYED RELEASE ORAL
Qty: 90 CAPSULE | Refills: 3 | Status: SHIPPED | OUTPATIENT
Start: 2019-06-25 | End: 2020-06-22 | Stop reason: SDUPTHER

## 2019-07-05 ENCOUNTER — OFFICE VISIT (OUTPATIENT)
Dept: FAMILY MEDICINE CLINIC | Age: 84
End: 2019-07-05
Payer: MEDICARE

## 2019-07-05 ENCOUNTER — TELEPHONE (OUTPATIENT)
Dept: FAMILY MEDICINE CLINIC | Age: 84
End: 2019-07-05

## 2019-07-05 VITALS
SYSTOLIC BLOOD PRESSURE: 104 MMHG | WEIGHT: 218.6 LBS | BODY MASS INDEX: 33.73 KG/M2 | OXYGEN SATURATION: 90 % | HEART RATE: 77 BPM | DIASTOLIC BLOOD PRESSURE: 62 MMHG

## 2019-07-05 DIAGNOSIS — L97.519 ULCER OF RIGHT GREAT TOE DUE TO DIABETES MELLITUS (HCC): ICD-10-CM

## 2019-07-05 DIAGNOSIS — I73.9 PERIPHERAL VASCULAR DISEASE OF LOWER EXTREMITY (HCC): ICD-10-CM

## 2019-07-05 DIAGNOSIS — I50.42 CHRONIC COMBINED SYSTOLIC AND DIASTOLIC CONGESTIVE HEART FAILURE (HCC): ICD-10-CM

## 2019-07-05 DIAGNOSIS — R60.0 LOCALIZED EDEMA: ICD-10-CM

## 2019-07-05 DIAGNOSIS — E11.621 ULCER OF RIGHT GREAT TOE DUE TO DIABETES MELLITUS (HCC): ICD-10-CM

## 2019-07-05 DIAGNOSIS — E11.21 TYPE 2 DIABETES MELLITUS WITH DIABETIC NEPHROPATHY, WITH LONG-TERM CURRENT USE OF INSULIN (HCC): ICD-10-CM

## 2019-07-05 DIAGNOSIS — I48.20 CHRONIC ATRIAL FIBRILLATION (HCC): ICD-10-CM

## 2019-07-05 DIAGNOSIS — Z79.4 TYPE 2 DIABETES MELLITUS WITH DIABETIC NEPHROPATHY, WITH LONG-TERM CURRENT USE OF INSULIN (HCC): ICD-10-CM

## 2019-07-05 DIAGNOSIS — L98.492 CALLOUS ULCER WITH FAT LAYER EXPOSED (HCC): ICD-10-CM

## 2019-07-05 DIAGNOSIS — I65.23 CAROTID STENOSIS, BILATERAL: ICD-10-CM

## 2019-07-05 LAB
AGE FOR GFR: 82
ANION GAP SERPL CALCULATED.3IONS-SCNC: 16 MMOL/L
B-TYPE NATRIURETIC PEPTIDE: 653 PG/ML (ref 0–100)
BUN BLDV-MCNC: 58 MG/DL (ref 7–17)
CALCIUM SERPL-MCNC: 8.9 MG/DL (ref 8.4–10.2)
CHLORIDE BLD-SCNC: 100 MMOL/L (ref 98–120)
CO2: 26 MMOL/L (ref 22–31)
CREAT SERPL-MCNC: 1.8 MG/DL (ref 0.5–1)
EGFR BF: 33 ML/MIN/1.73 M2
EGFR BM: 44 ML/MIN/1.73 M2
EGFR WF: 27 ML/MIN/1.73 M2
EGFR WM: 36 ML/MIN/1.73 M2
GLUCOSE: 232 MG/DL (ref 65–105)
POTASSIUM SERPL-SCNC: 5.3 MMOL/L (ref 3.6–5)
SODIUM BLD-SCNC: 137 MMOL/L (ref 135–145)

## 2019-07-05 PROCEDURE — 1036F TOBACCO NON-USER: CPT | Performed by: FAMILY MEDICINE

## 2019-07-05 PROCEDURE — G8400 PT W/DXA NO RESULTS DOC: HCPCS | Performed by: FAMILY MEDICINE

## 2019-07-05 PROCEDURE — 4040F PNEUMOC VAC/ADMIN/RCVD: CPT | Performed by: FAMILY MEDICINE

## 2019-07-05 PROCEDURE — 1090F PRES/ABSN URINE INCON ASSESS: CPT | Performed by: FAMILY MEDICINE

## 2019-07-05 PROCEDURE — G8598 ASA/ANTIPLAT THER USED: HCPCS | Performed by: FAMILY MEDICINE

## 2019-07-05 PROCEDURE — G8427 DOCREV CUR MEDS BY ELIG CLIN: HCPCS | Performed by: FAMILY MEDICINE

## 2019-07-05 PROCEDURE — 1123F ACP DISCUSS/DSCN MKR DOCD: CPT | Performed by: FAMILY MEDICINE

## 2019-07-05 PROCEDURE — G8417 CALC BMI ABV UP PARAM F/U: HCPCS | Performed by: FAMILY MEDICINE

## 2019-07-05 PROCEDURE — 99214 OFFICE O/P EST MOD 30 MIN: CPT | Performed by: FAMILY MEDICINE

## 2019-07-05 RX ORDER — BENZONATATE 100 MG/1
CAPSULE ORAL
COMMUNITY
End: 2021-01-01

## 2019-07-05 RX ORDER — FUROSEMIDE 20 MG/1
20 TABLET ORAL DAILY
Qty: 60 TABLET | Refills: 5
Start: 2019-07-05 | End: 2019-12-12

## 2019-07-05 RX ORDER — RANITIDINE HCL 75 MG
75 TABLET ORAL
COMMUNITY
End: 2019-12-12

## 2019-07-05 RX ORDER — LISINOPRIL 2.5 MG/1
TABLET ORAL
Refills: 0 | COMMUNITY
Start: 2019-05-23 | End: 2019-07-05 | Stop reason: SINTOL

## 2019-07-05 NOTE — PROGRESS NOTES
stage 3 (HCC)     [facility]     Glaucoma     Hyperlipidemia     Hypertension     Pacemaker, artificial     Peripheral vascular disease of lower extremity ([facility] 12/17/2018    Type II diabetes mellitus, uncontrolled ([facility]       Past Surgical History:   Procedure Laterality Date    CHOLECYSTECTOMY      COLONOSCOPY      EYE SURGERY      HYSTERECTOMY      PACEMAKER INSERTION         Family History   Problem Relation Age of Onset    Diabetes Mother     Heart Disease Father     Lung Cancer Sister        Social History     Tobacco Use    Smoking status: Never Smoker    Smokeless tobacco: Never Used   Substance Use Topics    Alcohol use: No      Current Outpatient Medications   Medication Sig Dispense Refill    benzonatate (TESSALON) 100 MG capsule benzonatate 100 mg capsule   Take 1 capsule 3 times a day by oral route as needed.       ranitidine (ZANTAC) 75 MG tablet Take 75 mg by mouth      furosemide (LASIX) 20 MG tablet Take 1 tablet by mouth daily With an additional 20 mg dose if weight goes up by 1# 60 tablet 5    DULoxetine (CYMBALTA) 60 MG extended release capsule TAKE 1 CAPSULE DAILY 90 capsule 3    insulin aspart (NOVOLOG FLEXPEN) 100 UNIT/ML injection pen 15 units before biggest meal and also sliding scale 16 pen 3    latanoprost (XALATAN) 0.005 % ophthalmic solution 1 drop nightly      clopidogrel (PLAVIX) 75 MG tablet Take 75 mg by mouth daily      Insulin Degludec (TRESIBA FLEXTOUCH) 100 UNIT/ML SOPN Inject 40 Units into the skin daily Indications: taking 25 units daily 5 pen 5    cetirizine (ZYRTEC) 10 MG tablet Take 1 tablet by mouth daily 90 tablet 1    nebivolol (BYSTOLIC) 5 MG tablet Take 1 tablet by mouth daily (Patient taking differently: Take 5 mg by mouth daily Indications: taking 1/2 tab by mouth daily. ) 90 tablet 1    azelastine HCl 0.15 % SOLN azelastine 0.15 % (205.5 mcg) nasal spray      Insulin Pen Needle 31G X 5 MM MISC 1 each by Does not apply route 2 suspect Roxanne his symptoms are related to volume depletion and cerebral hypoperfusion. Will check labs decrease the diuretics and try to walk the tight rope between the hypovolemia and the heart failure symptoms. Brain Natriuretic Peptide   2. Type 2 diabetes mellitus with diabetic nephropathy, with long-term current use of insulin ([facility] [address]  Diabetic Shoe   3. Peripheral vascular disease of lower extremity (HCC)   DIABETES FOOT EXAM    Diabetic Shoe   4. Carotid stenosis, bilateral   DIABETES FOOT EXAM    US Carotid Artery Bilateral-has known history of carotid stenosis. Need to do studies to ensure this is not progressed which could exacerbate or cause her symptoms   5. Chronic atrial fibrillation ([facility])  Diabetic Shoe    Basic Metabolic Panel-has pacer and symptoms are more suggestive of hypoperfusion then rate control issues but will cardiology to re-interrogate her pacer    Brain Natriuretic Peptide   6. Ulcer of right great toe due to diabetes mellitus ([facility]   DIABETES FOOT EXAM    Diabetic Shoe   7. Callous ulcer with fat layer exposed ([facility]   DIABETES FOOT EXAM    Diabetic Shoe continue with wound care and diabetic shoes ordered today   8. Localized edema       Patient Instructions   Change the furosemide (laisx) to 1 tablet every day with one additional tablet on the days that the weight has gone up 1 lb or more. Also watch for extra swelling or SOB. Check the labs today. Get the carotid USN and the diabetic shoes.         Lab Results   Component Value Date    WBC 4.57 (L) 03/28/2019    HGB 13.7 03/28/2019    HCT 43.8 03/28/2019     03/28/2019    CHOL 136 08/15/2018    TRIG 114 08/15/2018    ALT 22 08/15/2018    AST 15 08/15/2018     07/05/2019    K 5.3 (H) 07/05/2019     07/05/2019    CREATININE 1.8 (H) 07/05/2019    BUN 58 (H) 07/05/2019    CO2 26 07/05/2019    TSH 0.775 03/02/2018    LABA1C 7.9 12/17/2018    LABA1C 8.0 08/20/2018    LABA1C 7.9 04/16/2018       Return in

## 2019-07-07 PROBLEM — I11.0 HYPERTENSIVE HEART DISEASE WITH CHRONIC COMBINED SYSTOLIC AND DIASTOLIC CONGESTIVE HEART FAILURE (HCC): Status: ACTIVE | Noted: 2019-05-23

## 2019-07-07 PROBLEM — I50.42 HYPERTENSIVE HEART DISEASE WITH CHRONIC COMBINED SYSTOLIC AND DIASTOLIC CONGESTIVE HEART FAILURE (HCC): Status: ACTIVE | Noted: 2019-05-23

## 2019-07-07 PROBLEM — I42.9 CARDIOMYOPATHY (HCC): Status: ACTIVE | Noted: 2019-05-23

## 2019-07-07 PROBLEM — E87.5 HYPERKALEMIA: Status: ACTIVE | Noted: 2019-07-07

## 2019-07-17 DIAGNOSIS — E11.21 TYPE 2 DIABETES MELLITUS WITH DIABETIC NEPHROPATHY, WITH LONG-TERM CURRENT USE OF INSULIN (HCC): ICD-10-CM

## 2019-07-17 DIAGNOSIS — Z79.4 TYPE 2 DIABETES MELLITUS WITH DIABETIC NEPHROPATHY, WITH LONG-TERM CURRENT USE OF INSULIN (HCC): ICD-10-CM

## 2019-07-18 RX ORDER — INSULIN DEGLUDEC INJECTION 100 U/ML
INJECTION, SOLUTION SUBCUTANEOUS
Qty: 45 ML | Refills: 3 | Status: SHIPPED | OUTPATIENT
Start: 2019-07-18 | End: 2019-12-12

## 2019-07-18 NOTE — TELEPHONE ENCOUNTER
[facility] is calling to request a refill on the following medication(s):  Requested Prescriptions     Pending Prescriptions Disp Refills    TRESIBA FLEXTOUCH 100 UNIT/ML SOPN [Pharmacy Med Name: TRESIBA FLEX PEN 100U/ML] 45 mL 3     Sig: INJECT 50 UNITS            SUBCUTANEOUSLY DAILY       Last Visit Date (If Applicable):  4/5/4476    Next Visit Date:    8/15/2019

## 2019-07-26 NOTE — TELEPHONE ENCOUNTER
Sophy Chaney is calling to request a refill on the following medication(s):  Requested Prescriptions     Pending Prescriptions Disp Refills    Insulin Pen Needle 31G X 5 MM MISC 300 each 3     Si each by Does not apply route 2 times daily       Last Visit Date (If Applicable):  3/0/7206    Next Visit Date:    2019

## 2019-08-22 ENCOUNTER — OFFICE VISIT (OUTPATIENT)
Dept: FAMILY MEDICINE CLINIC | Age: 84
End: 2019-08-22
Payer: MEDICARE

## 2019-08-22 VITALS
BODY MASS INDEX: 33.49 KG/M2 | SYSTOLIC BLOOD PRESSURE: 130 MMHG | DIASTOLIC BLOOD PRESSURE: 60 MMHG | HEART RATE: 61 BPM | WEIGHT: 217 LBS | OXYGEN SATURATION: 98 %

## 2019-08-22 DIAGNOSIS — I50.42 CHRONIC COMBINED SYSTOLIC AND DIASTOLIC CONGESTIVE HEART FAILURE (HCC): ICD-10-CM

## 2019-08-22 DIAGNOSIS — I65.23 CAROTID STENOSIS, BILATERAL: ICD-10-CM

## 2019-08-22 DIAGNOSIS — N18.30 STAGE 3 CHRONIC KIDNEY DISEASE (HCC): ICD-10-CM

## 2019-08-22 DIAGNOSIS — Z79.4 TYPE 2 DIABETES MELLITUS WITH DIABETIC NEPHROPATHY, WITH LONG-TERM CURRENT USE OF INSULIN (HCC): Primary | ICD-10-CM

## 2019-08-22 DIAGNOSIS — E11.21 TYPE 2 DIABETES MELLITUS WITH DIABETIC NEPHROPATHY, WITH LONG-TERM CURRENT USE OF INSULIN (HCC): Primary | ICD-10-CM

## 2019-08-22 DIAGNOSIS — M79.604 RIGHT LEG PAIN: ICD-10-CM

## 2019-08-22 DIAGNOSIS — M70.61 TROCHANTERIC BURSITIS, RIGHT HIP: ICD-10-CM

## 2019-08-22 DIAGNOSIS — I67.9 CEREBROVASCULAR DISEASE: ICD-10-CM

## 2019-08-22 DIAGNOSIS — I48.20 CHRONIC ATRIAL FIBRILLATION (HCC): ICD-10-CM

## 2019-08-22 LAB
AGE FOR GFR: 82
ANION GAP SERPL CALCULATED.3IONS-SCNC: 21 MMOL/L
BUN BLDV-MCNC: 50 MG/DL (ref 7–17)
CHLORIDE BLD-SCNC: 98 MMOL/L (ref 98–120)
CO2: 21 MMOL/L (ref 22–31)
CREAT SERPL-MCNC: 1.7 MG/DL (ref 0.5–1)
EGFR BF: 35 ML/MIN/1.73 M2
EGFR BM: 47 ML/MIN/1.73 M2
EGFR WF: 29 ML/MIN/1.73 M2
EGFR WM: 39 ML/MIN/1.73 M2
HBA1C MFR BLD: 7.4 %
POTASSIUM SERPL-SCNC: 4.6 MMOL/L (ref 3.6–5)
SODIUM BLD-SCNC: 135 MMOL/L (ref 135–145)

## 2019-08-22 PROCEDURE — G8417 CALC BMI ABV UP PARAM F/U: HCPCS | Performed by: FAMILY MEDICINE

## 2019-08-22 PROCEDURE — G8400 PT W/DXA NO RESULTS DOC: HCPCS | Performed by: FAMILY MEDICINE

## 2019-08-22 PROCEDURE — 1090F PRES/ABSN URINE INCON ASSESS: CPT | Performed by: FAMILY MEDICINE

## 2019-08-22 PROCEDURE — 99214 OFFICE O/P EST MOD 30 MIN: CPT | Performed by: FAMILY MEDICINE

## 2019-08-22 PROCEDURE — G8598 ASA/ANTIPLAT THER USED: HCPCS | Performed by: FAMILY MEDICINE

## 2019-08-22 PROCEDURE — G8427 DOCREV CUR MEDS BY ELIG CLIN: HCPCS | Performed by: FAMILY MEDICINE

## 2019-08-22 PROCEDURE — 1036F TOBACCO NON-USER: CPT | Performed by: FAMILY MEDICINE

## 2019-08-22 PROCEDURE — 83036 HEMOGLOBIN GLYCOSYLATED A1C: CPT | Performed by: FAMILY MEDICINE

## 2019-08-22 PROCEDURE — 1123F ACP DISCUSS/DSCN MKR DOCD: CPT | Performed by: FAMILY MEDICINE

## 2019-08-22 PROCEDURE — 4040F PNEUMOC VAC/ADMIN/RCVD: CPT | Performed by: FAMILY MEDICINE

## 2019-08-22 RX ORDER — NEBIVOLOL 5 MG/1
2.5 TABLET ORAL DAILY
Qty: 30 TABLET | Refills: 5
Start: 2019-08-22 | End: 2020-01-09 | Stop reason: DRUGHIGH

## 2019-08-22 ASSESSMENT — ENCOUNTER SYMPTOMS
DIARRHEA: 1
SHORTNESS OF BREATH: 0

## 2019-08-22 NOTE — PROGRESS NOTES
Amoxicillin     Avandia [Rosiglitazone]     Benzalkonium Chloride     Bifidobacterium     Brimonidine Tartrate     Cephalexin     Codeine     Diclofenac     Dorzolamide     Etodolac     Feldene [Piroxicam]     Lactobacillus     Levaquin [Levofloxacin In D5w]      Sweating, irritable.  Lexapro [Escitalopram]     Metoprolol     Metronidazole     Olmesartan      Other reaction(s): Dizziness    Pantoprazole     Penicillins     Pilocarpine Hydrochloride [Pilocarpine]      Opthalmic solution. Gives headache      Prednisone     Propoxyphene      Darvocet    Qvar [Beclomethasone] Swelling    Streptococci     Travoprost     Vasotec [Enalapril Maleate]     Vicodin [Hydrocodone-Acetaminophen]     Voltaren [Diclofenac Sodium]        Health Maintenance   Topic Date Due    DTaP/Tdap/Td vaccine (1 - Tdap) 09/20/1955    Shingles Vaccine (1 of 2) 09/20/1986    DEXA (modify frequency per FRAX score)  09/20/2001    Pneumococcal 65+ years Vaccine (2 of 2 - PCV13) 07/23/2009    Annual Wellness Visit (AWV)  05/03/2018    Flu vaccine (1) 09/01/2019    Potassium monitoring  07/05/2020    Creatinine monitoring  07/05/2020       Subjective:      Review of Systems  Constitutional: Negative for activity change, appetite change and fatigue. Respiratory: Negative for shortness of breath. Cardiovascular: Negative for chest pain and palpitations. Gastrointestinal: Positive for diarrhea (diverticulitis). Endocrine: Negative for polydipsia, polyphagia and polyuria. Checks BS twice daily   Genitourinary: Negative for frequency and urgency. Neurological: Positive for dizziness ( when she first stands up and sometimes in between, daily). Negative for light-headedness. Psychiatric/Behavioral: Negative for sleep disturbance.    Objective:     /60 (Site: Right Upper Arm, Position: Sitting, Cuff Size: Medium Adult)   Pulse 61   Wt 217 lb (98.4 kg)   SpO2 98%   BMI 33.49 kg/m²     Physical anything at this time for the ongoing leg pain continue to use the OTC Tylenol as needed would consider some physical therapy and if this is not helpful would consider an EMG to rule out radiculopathy as a cause of this leg pain. Or possibly even a trochanteric injection   4. Trochanteric bursitis, right hip     5. Carotid stenosis, bilateral   continue follow-up with vascular and  risk factor modification. Reviewed TIA signs and symptoms which would prompt an urgent referral   6. Cerebrovascular disease     7. Chronic atrial fibrillation (HCC)   stable at this time continue cardiology follow-up and risk factor modification   8. Stage 3 chronic kidney disease ([facility] [facility]   continue on her current medications. Recheck the labs today to verify the renal status is stable and to recheck the potassium that was elevated at her last check a month ago         Lab Results   Component Value Date    WBC 4.57 (L) 03/28/2019    HGB 13.7 03/28/2019    HCT 43.8 03/28/2019     03/28/2019    CHOL 136 08/15/2018    TRIG 114 08/15/2018    ALT 22 08/15/2018    AST 15 08/15/2018     07/05/2019    K 5.3 (H) 07/05/2019     07/05/2019    CREATININE 1.8 (H) 07/05/2019    BUN 58 (H) 07/05/2019    CO2 26 07/05/2019    TSH 0.775 03/02/2018    LABA1C 7.4 08/22/2019    LABA1C 7.9 12/17/2018    LABA1C 8.0 08/20/2018       Return in about 3 months (around 11/22/2019). Patient given educational materials - see patientinstructions. Discussed use, benefit, and side effects of prescribed medications. All patient questions answered. Pt voiced understanding. Reviewed health maintenance. Instructed to continue current medications, diet andexercise. Patient agreed with treatment plan. Follow up as directed.      Electronically signed by Larissa Concepcion MD on 8/22/2019

## 2019-08-22 NOTE — PROGRESS NOTES
Review of Systems   Constitutional: Negative for activity change, appetite change and fatigue. Respiratory: Negative for shortness of breath. Cardiovascular: Negative for chest pain and palpitations. Gastrointestinal: Positive for diarrhea (diverticulitis). Endocrine: Negative for polydipsia, polyphagia and polyuria. Checks BS twice daily   Genitourinary: Negative for frequency and urgency. Neurological: Positive for dizziness ( when she first stands up and sometimes in between, daily). Negative for light-headedness. Psychiatric/Behavioral: Negative for sleep disturbance.

## 2019-09-05 LAB
BUN BLDV-MCNC: 43 MG/DL (ref 7–18)
CALCIUM SERPL-MCNC: 9.1 MG/DL (ref 8.5–10.1)
CHLORIDE BLD-SCNC: 100 MMOL/L (ref 97–107)
CO2: 26 MMOL/L (ref 21–32)
CREAT SERPL-MCNC: 2 MG/DL (ref 0.55–1.02)
GFR CALCULATED: 25
GLUCOSE: 364 MG/DL (ref 70–99)
HCT VFR BLD CALC: 39.1 % (ref 34.6–44.1)
HEMOGLOBIN: 13.1 G/DL (ref 11.7–14.9)
MCH RBC QN AUTO: 32.8 PG (ref 27.8–33.2)
MCHC RBC AUTO-ENTMCNC: 33.5 G/DL (ref 32.7–34.8)
MCV RBC AUTO: 98 FL (ref 83–97.4)
PDW BLD-RTO: 14.2 % (ref 12.2–15.8)
PLATELET # BLD: 209 10'3/UL (ref 122–359)
PMV BLD AUTO: 9.2 FL (ref 7.6–10.6)
POTASSIUM SERPL-SCNC: 5.1 MMOL/L (ref 3.5–5.1)
RBC # BLD: 3.99 10'6/UL (ref 3.85–4.88)
SODIUM BLD-SCNC: 137 MMOL/L (ref 136–145)
WBC: 9.8 10'3/UL (ref 3.2–9.3)

## 2019-09-07 LAB — GRAM STAIN RESULT: NORMAL

## 2019-10-10 LAB
BUN BLDV-MCNC: 40 MG/DL (ref 7–18)
C-REACTIVE PROTEIN: 1.2 MG/DL (ref 0–0.3)
CALCIUM SERPL-MCNC: 8.8 MG/DL (ref 8.5–10.1)
CHLORIDE BLD-SCNC: 102 MMOL/L (ref 97–107)
CO2: 25 MMOL/L (ref 21–32)
CREAT SERPL-MCNC: 2.2 MG/DL (ref 0.55–1.02)
GFR CALCULATED: 23
GLUCOSE: 184 MG/DL (ref 70–99)
HCT VFR BLD CALC: 39.8 % (ref 34.6–44.1)
HEMOGLOBIN: 13.6 G/DL (ref 11.7–14.9)
MCH RBC QN AUTO: 33.3 PG (ref 27.8–33.2)
MCHC RBC AUTO-ENTMCNC: 34 G/DL (ref 32.7–34.8)
MCV RBC AUTO: 97.7 FL (ref 83–97.4)
PDW BLD-RTO: 13.3 % (ref 12.2–15.8)
PLATELET # BLD: 224 10'3/UL (ref 122–359)
PMV BLD AUTO: 9.3 FL (ref 7.6–10.6)
POTASSIUM SERPL-SCNC: 4.5 MMOL/L (ref 3.5–5.1)
RBC # BLD: 4.08 10'6/UL (ref 3.85–4.88)
SEDIMENTATION RATE, ERYTHROCYTE: 29 MM/HR (ref 0–30)
SODIUM BLD-SCNC: 137 MMOL/L (ref 136–145)
WBC: 9.9 10'3/UL (ref 3.2–9.3)

## 2019-11-04 ENCOUNTER — TELEPHONE (OUTPATIENT)
Dept: FAMILY MEDICINE CLINIC | Age: 84
End: 2019-11-04

## 2019-11-12 ENCOUNTER — TELEPHONE (OUTPATIENT)
Dept: FAMILY MEDICINE CLINIC | Age: 84
End: 2019-11-12

## 2019-12-12 ENCOUNTER — OFFICE VISIT (OUTPATIENT)
Dept: FAMILY MEDICINE CLINIC | Age: 84
End: 2019-12-12
Payer: MEDICARE

## 2019-12-12 VITALS
HEIGHT: 68 IN | OXYGEN SATURATION: 99 % | DIASTOLIC BLOOD PRESSURE: 66 MMHG | HEART RATE: 67 BPM | WEIGHT: 217 LBS | BODY MASS INDEX: 32.89 KG/M2 | SYSTOLIC BLOOD PRESSURE: 132 MMHG

## 2019-12-12 DIAGNOSIS — I73.9 PERIPHERAL VASCULAR DISEASE OF LOWER EXTREMITY (HCC): ICD-10-CM

## 2019-12-12 DIAGNOSIS — Z23 NEED FOR INFLUENZA VACCINATION: ICD-10-CM

## 2019-12-12 DIAGNOSIS — I67.9 CEREBROVASCULAR DISEASE: ICD-10-CM

## 2019-12-12 DIAGNOSIS — L97.519 ULCER OF RIGHT GREAT TOE DUE TO DIABETES MELLITUS (HCC): ICD-10-CM

## 2019-12-12 DIAGNOSIS — E11.621 ULCER OF RIGHT GREAT TOE DUE TO DIABETES MELLITUS (HCC): ICD-10-CM

## 2019-12-12 DIAGNOSIS — E11.22 TYPE 2 DIABETES MELLITUS WITH STAGE 1 CHRONIC KIDNEY DISEASE, WITH LONG-TERM CURRENT USE OF INSULIN (HCC): ICD-10-CM

## 2019-12-12 DIAGNOSIS — Z79.4 TYPE 2 DIABETES MELLITUS WITH DIABETIC NEPHROPATHY, WITH LONG-TERM CURRENT USE OF INSULIN (HCC): Primary | ICD-10-CM

## 2019-12-12 DIAGNOSIS — I50.42 CHRONIC COMBINED SYSTOLIC AND DIASTOLIC CONGESTIVE HEART FAILURE (HCC): ICD-10-CM

## 2019-12-12 DIAGNOSIS — Z79.4 TYPE 2 DIABETES MELLITUS WITH STAGE 1 CHRONIC KIDNEY DISEASE, WITH LONG-TERM CURRENT USE OF INSULIN (HCC): ICD-10-CM

## 2019-12-12 DIAGNOSIS — N18.1 TYPE 2 DIABETES MELLITUS WITH STAGE 1 CHRONIC KIDNEY DISEASE, WITH LONG-TERM CURRENT USE OF INSULIN (HCC): ICD-10-CM

## 2019-12-12 DIAGNOSIS — E11.21 TYPE 2 DIABETES MELLITUS WITH DIABETIC NEPHROPATHY, WITH LONG-TERM CURRENT USE OF INSULIN (HCC): Primary | ICD-10-CM

## 2019-12-12 PROBLEM — I51.9 LV DYSFUNCTION: Status: ACTIVE | Noted: 2019-08-22

## 2019-12-12 LAB — HBA1C MFR BLD: 7.4 %

## 2019-12-12 PROCEDURE — 90653 IIV ADJUVANT VACCINE IM: CPT | Performed by: FAMILY MEDICINE

## 2019-12-12 PROCEDURE — 1123F ACP DISCUSS/DSCN MKR DOCD: CPT | Performed by: FAMILY MEDICINE

## 2019-12-12 PROCEDURE — 99214 OFFICE O/P EST MOD 30 MIN: CPT | Performed by: FAMILY MEDICINE

## 2019-12-12 PROCEDURE — 4040F PNEUMOC VAC/ADMIN/RCVD: CPT | Performed by: FAMILY MEDICINE

## 2019-12-12 PROCEDURE — 83036 HEMOGLOBIN GLYCOSYLATED A1C: CPT | Performed by: FAMILY MEDICINE

## 2019-12-12 PROCEDURE — G8427 DOCREV CUR MEDS BY ELIG CLIN: HCPCS | Performed by: FAMILY MEDICINE

## 2019-12-12 PROCEDURE — 1036F TOBACCO NON-USER: CPT | Performed by: FAMILY MEDICINE

## 2019-12-12 PROCEDURE — G8482 FLU IMMUNIZE ORDER/ADMIN: HCPCS | Performed by: FAMILY MEDICINE

## 2019-12-12 PROCEDURE — G0008 ADMIN INFLUENZA VIRUS VAC: HCPCS | Performed by: FAMILY MEDICINE

## 2019-12-12 PROCEDURE — G8598 ASA/ANTIPLAT THER USED: HCPCS | Performed by: FAMILY MEDICINE

## 2019-12-12 PROCEDURE — G8400 PT W/DXA NO RESULTS DOC: HCPCS | Performed by: FAMILY MEDICINE

## 2019-12-12 PROCEDURE — G8417 CALC BMI ABV UP PARAM F/U: HCPCS | Performed by: FAMILY MEDICINE

## 2019-12-12 PROCEDURE — 1090F PRES/ABSN URINE INCON ASSESS: CPT | Performed by: FAMILY MEDICINE

## 2019-12-12 RX ORDER — ATORVASTATIN CALCIUM 40 MG/1
20 TABLET, FILM COATED ORAL EVERY EVENING
Refills: 0 | COMMUNITY
Start: 2019-11-15 | End: 2019-12-20

## 2019-12-12 RX ORDER — FOLIC ACID 1 MG/1
1 TABLET ORAL DAILY
COMMUNITY

## 2019-12-12 RX ORDER — FUROSEMIDE 40 MG/1
40 TABLET ORAL DAILY
COMMUNITY
End: 2020-01-09 | Stop reason: DRUGHIGH

## 2019-12-12 RX ORDER — ACETAMINOPHEN 500 MG
500 TABLET ORAL EVERY 6 HOURS PRN
COMMUNITY

## 2020-01-02 ENCOUNTER — TELEPHONE (OUTPATIENT)
Dept: FAMILY MEDICINE CLINIC | Age: 85
End: 2020-01-02

## 2020-01-02 NOTE — TELEPHONE ENCOUNTER
Start with OTC Systane ultra eye drops 3-4 times daily at least 30 minutes apart from any prescription eye drops to keep the eyes moist and see if this helps at all with the problem. Let me know how this works. Also question to make sure no signs of near syncope.

## 2020-01-02 NOTE — TELEPHONE ENCOUNTER
Patient called stating she is having more trouble with her eyes. She states she can only do something for a couple minutes before her vision gets blurred and her eyes bother her. She cannot use her IPad very long at all. She was having trouble seeing the screen at [employer] this past week. She wants to know if it could be caused from the furosemide she is taking. Her daughter remember that these same things happened in the past when she was on the furosemide. Please advise.

## 2020-01-09 ENCOUNTER — OFFICE VISIT (OUTPATIENT)
Dept: FAMILY MEDICINE CLINIC | Age: 85
End: 2020-01-09
Payer: MEDICARE

## 2020-01-09 VITALS
OXYGEN SATURATION: 97 % | HEART RATE: 54 BPM | WEIGHT: 212.9 LBS | SYSTOLIC BLOOD PRESSURE: 114 MMHG | DIASTOLIC BLOOD PRESSURE: 66 MMHG | BODY MASS INDEX: 32.83 KG/M2

## 2020-01-09 PROCEDURE — G8482 FLU IMMUNIZE ORDER/ADMIN: HCPCS | Performed by: FAMILY MEDICINE

## 2020-01-09 PROCEDURE — G8400 PT W/DXA NO RESULTS DOC: HCPCS | Performed by: FAMILY MEDICINE

## 2020-01-09 PROCEDURE — 1036F TOBACCO NON-USER: CPT | Performed by: FAMILY MEDICINE

## 2020-01-09 PROCEDURE — G8427 DOCREV CUR MEDS BY ELIG CLIN: HCPCS | Performed by: FAMILY MEDICINE

## 2020-01-09 PROCEDURE — 1123F ACP DISCUSS/DSCN MKR DOCD: CPT | Performed by: FAMILY MEDICINE

## 2020-01-09 PROCEDURE — G0444 DEPRESSION SCREEN ANNUAL: HCPCS | Performed by: FAMILY MEDICINE

## 2020-01-09 PROCEDURE — G8417 CALC BMI ABV UP PARAM F/U: HCPCS | Performed by: FAMILY MEDICINE

## 2020-01-09 PROCEDURE — 99212 OFFICE O/P EST SF 10 MIN: CPT | Performed by: FAMILY MEDICINE

## 2020-01-09 PROCEDURE — 1090F PRES/ABSN URINE INCON ASSESS: CPT | Performed by: FAMILY MEDICINE

## 2020-01-09 PROCEDURE — 99214 OFFICE O/P EST MOD 30 MIN: CPT | Performed by: FAMILY MEDICINE

## 2020-01-09 PROCEDURE — 4040F PNEUMOC VAC/ADMIN/RCVD: CPT | Performed by: FAMILY MEDICINE

## 2020-01-09 RX ORDER — NEBIVOLOL 5 MG/1
5 TABLET ORAL
COMMUNITY
Start: 2019-12-19 | End: 2021-01-04 | Stop reason: SDUPTHER

## 2020-01-09 RX ORDER — FUROSEMIDE 40 MG/1
40 TABLET ORAL DAILY
Qty: 30 TABLET | Refills: 5 | Status: SHIPPED
Start: 2020-01-09 | End: 2020-04-17 | Stop reason: SDUPTHER

## 2020-01-09 ASSESSMENT — PATIENT HEALTH QUESTIONNAIRE - PHQ9
9. THOUGHTS THAT YOU WOULD BE BETTER OFF DEAD, OR OF HURTING YOURSELF: 1
SUM OF ALL RESPONSES TO PHQ9 QUESTIONS 1 & 2: 4
3. TROUBLE FALLING OR STAYING ASLEEP: 3
1. LITTLE INTEREST OR PLEASURE IN DOING THINGS: 2
10. IF YOU CHECKED OFF ANY PROBLEMS, HOW DIFFICULT HAVE THESE PROBLEMS MADE IT FOR YOU TO DO YOUR WORK, TAKE CARE OF THINGS AT HOME, OR GET ALONG WITH OTHER PEOPLE: 1
4. FEELING TIRED OR HAVING LITTLE ENERGY: 3
6. FEELING BAD ABOUT YOURSELF - OR THAT YOU ARE A FAILURE OR HAVE LET YOURSELF OR YOUR FAMILY DOWN: 1
8. MOVING OR SPEAKING SO SLOWLY THAT OTHER PEOPLE COULD HAVE NOTICED. OR THE OPPOSITE, BEING SO FIGETY OR RESTLESS THAT YOU HAVE BEEN MOVING AROUND A LOT MORE THAN USUAL: 1
5. POOR APPETITE OR OVEREATING: 2
7. TROUBLE CONCENTRATING ON THINGS, SUCH AS READING THE NEWSPAPER OR WATCHING TELEVISION: 3
2. FEELING DOWN, DEPRESSED OR HOPELESS: 2
SUM OF ALL RESPONSES TO PHQ QUESTIONS 1-9: 18
SUM OF ALL RESPONSES TO PHQ QUESTIONS 1-9: 18

## 2020-01-09 ASSESSMENT — COLUMBIA-SUICIDE SEVERITY RATING SCALE - C-SSRS
2. HAVE YOU ACTUALLY HAD ANY THOUGHTS OF KILLING YOURSELF?: NO
6. HAVE YOU EVER DONE ANYTHING, STARTED TO DO ANYTHING, OR PREPARED TO DO ANYTHING TO END YOUR LIFE?: NO
1. WITHIN THE PAST MONTH, HAVE YOU WISHED YOU WERE DEAD OR WISHED YOU COULD GO TO SLEEP AND NOT WAKE UP?: YES

## 2020-01-09 NOTE — PROGRESS NOTES
[address] [facility]  [address]  Dept: [phone]  Dept MTL:[phone]    Cathryn Slaughter is a 81 y.o. female who presents today for her medical conditions/complaints as notedbelow. Cathryn Slaughter is c/o of 1 Month Follow-Up (im not sure if i have everything right or not with my diabetes, its been changed so much and i have an opening on my right great toe again. am i suppose to be taking atorvastatin? or all of the other ones? it takes the visiting nurse an hour to fill my box for 2 weeks)      HPI:     HPI  Has the right toe ulcer opening-- the old one persists (1 cm) the new one also. Wound care will also be doing the doppler. Has a lump on the left outer leg for the last 2 months. Has had some drainage noted. Sees wound care on Feb 25. Will be seeing Dr. Ana Hurtado rather than Dr. Ole Valera at this time. Did try the systane ultra for her dry eyes, seems like this was a lot worse with the furosemide use. Does have an experience in the past of having an exacerbation of her eye symptoms when she was on a higher dose of the furosemide as well. There is also been concern in the past that if she gets slightly hypotensive that she has increased retinal ischemia and has more difficulty with her vision. Has not had chest pain she has not had shortness of breath she has not had PND she has not had orthopnea. Blood sugars Have been running pretty good. Usually before lunch and in the morning they are good. Later in the day they are little bit high. Has been taking the 35 of Tresiba and the 10 units prior to her biggest meal with occasionally little more the blood sugars been running high. Was little high over [place] but is running back down into a better controlled range. No episodes of hypoglycemia noted.   Since her hospital and then the nursing home stay has been checking the blood sugars 4 times a day due to the flucuations in the blood [Diclofenac Sodium]        Health Maintenance   Topic Date Due    DTaP/Tdap/Td vaccine (1 - Tdap) 09/20/1946    Hepatitis B vaccine (1 of 3 - Risk 3-dose series) 09/20/1954    Shingles Vaccine (1 of 2) 09/20/1986    DEXA (modify frequency per FRAX score)  09/20/2001    Annual Wellness Visit (AWV)  05/29/2019    Lipid screen  08/15/2019    Potassium monitoring  10/10/2020    Creatinine monitoring  10/10/2020    Flu vaccine  Completed    Pneumococcal 65+ years Vaccine  Completed    Hepatitis A vaccine  Aged Out    Hib vaccine  Aged Out    Meningococcal ([facility]) vaccine  Aged Out       Subjective:      Review of Systems    Objective:     /66   Pulse 54   Wt 212 lb 14.4 oz (96.6 kg)   SpO2 97%   BMI 32.83 kg/m²     Physical Exam  Vitals signs and nursing note reviewed. Constitutional:       Appearance: She is well-developed. HENT:      Head: Normocephalic. Right Ear: External ear normal.      Left Ear: External ear normal.      Nose: Nose normal.      Mouth/Throat:      Pharynx: No oropharyngeal exudate or posterior oropharyngeal erythema. Eyes:      Conjunctiva/sclera: Conjunctivae normal.      Pupils: Pupils are equal, round, and reactive to light. Neck:      Musculoskeletal: Normal range of motion and neck supple. Thyroid: No thyromegaly. Comments: Bilateral decreased carotid upstrokes  Cardiovascular:      Rate and Rhythm: Normal rate. Pulses: Normal pulses. Heart sounds: Murmur present. Comments: Irregular irregular rhythm with murmur at the LSB, bradycardic  Pulmonary:      Effort: Pulmonary effort is normal. No respiratory distress. Breath sounds: Normal breath sounds. No rales. Abdominal:      General: There is no distension. Palpations: Abdomen is soft. Tenderness: There is no tenderness. Musculoskeletal:      Right lower leg: Edema (Trace bilateral lower extremities) present. Left lower leg: Edema present. patientinstructions. Discussed use, benefit, and side effects of prescribed medications. All patient questions answered. Pt voiced understanding. Reviewed health maintenance. Instructed to continue current medications, diet andexercise. Patient agreed with treatment plan. Follow up as directed.      Electronically signed by Talisha Rudolph MD on 2/7/2020

## 2020-02-11 ENCOUNTER — TELEPHONE (OUTPATIENT)
Dept: FAMILY MEDICINE CLINIC | Age: 85
End: 2020-02-11

## 2020-02-11 NOTE — TELEPHONE ENCOUNTER
[facility] Transitions Initial Follow Up Call    Outreach made within 2 business days of discharge: Yes    Patient: Ilda Collet Patient : 1935   MRN: [id number]  Reason for Admission: vascular surgery  Discharge Date:  2020 - 2020      Spoke with: patient    Discharge department/facility: [facility]    TCM Interactive Patient Contact:  Was patient able to fill all prescriptions: Yes  Was patient instructed to bring all medications to the follow-up visit: Yes  Is patient taking all medications as directed in the discharge summary? Yes  Does patient understand their discharge instructions: Yes  Does patient have questions or concerns that need addressed prior to 7-14 day follow up office visit: yes - She wanted to know if she is to be taking Vitamin B6 - [facility] listed it on her discharge summary. She wants to wait to discuss this with the doctor at her appointment Friday.      Scheduled appointment with PCP within 7-14 days    Follow Up  Future Appointments   Date Time Provider Lorraine Zelaya   2020 10:15 AM [facility]   3/2/2020  9:15 AM Edith Pineda MD [facility]       Enid Yeung LPN

## 2020-02-14 ENCOUNTER — OFFICE VISIT (OUTPATIENT)
Dept: FAMILY MEDICINE CLINIC | Age: 85
End: 2020-02-14
Payer: MEDICARE

## 2020-02-14 VITALS
BODY MASS INDEX: 33.68 KG/M2 | DIASTOLIC BLOOD PRESSURE: 82 MMHG | OXYGEN SATURATION: 97 % | HEART RATE: 66 BPM | SYSTOLIC BLOOD PRESSURE: 126 MMHG | WEIGHT: 218.4 LBS

## 2020-02-14 PROCEDURE — 99211 OFF/OP EST MAY X REQ PHY/QHP: CPT

## 2020-02-14 PROCEDURE — 99495 TRANSJ CARE MGMT MOD F2F 14D: CPT | Performed by: FAMILY MEDICINE

## 2020-02-14 PROCEDURE — 1111F DSCHRG MED/CURRENT MED MERGE: CPT | Performed by: FAMILY MEDICINE

## 2020-02-14 RX ORDER — CLOPIDOGREL BISULFATE 75 MG/1
75 TABLET ORAL DAILY
Qty: 90 TABLET | Refills: 1 | Status: SHIPPED | OUTPATIENT
Start: 2020-02-14 | End: 2020-04-17 | Stop reason: SDUPTHER

## 2020-02-14 NOTE — PROGRESS NOTES
Post-Discharge Transitional Care Management Services or Hospital Follow Up      Eliezer Coombs   YOB: 1935    Date of Office Visit:  2/14/2020  Date of Hospital Admission: 2/6/2020   Date of Hospital Discharge: 2/8/2020    Care management risk score Rising risk (score 2-5) and Complex Care (Scores >=6): 4     Non face to face  following discharge, date last encounter closed (first attempt may have been earlier): 2/11/2020  4:45 PM     Call initiated 2 business days of discharge: Yes    Patient Active Problem List   Diagnosis    Anxiety    Chronic kidney disease    Coronary artery disease    Chronic atrial fibrillation    Chronic fatigue    Irritable bowel    Cerebrovascular disease    Hypertension    Hyperlipidemia    Type 2 diabetes mellitus with diabetic nephropathy, with long-term current use of insulin (HCC)    Carotid stenosis, bilateral    Pacemaker, artificial    Peripheral vascular disease of lower extremity ([address]    Cardiomyopathy ([address]    Hyperkalemia    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 (HCC)    [facility]peripheral vascular [facility]) [facility]    LV dysfunction       Allergies   Allergen Reactions    Cilostazol      Chest quivers and increased blood sugars  Elevated blood sugar    Ace Inhibitors      HYPERKALEMIA (even low dose)    Amitriptyline     Amlodipine      Other reaction(s): Dizziness    Ammonium Hydroxide     Amoxicillin     Avandia [Rosiglitazone]     Benzalkonium Chloride     Bifidobacterium     Brimonidine Tartrate     Cephalexin     Codeine     Diclofenac     Dorzolamide     Etodolac     Feldene [Piroxicam]     Lactobacillus     Levaquin [Levofloxacin In D5w]      Sweating, irritable.     Lexapro [Escitalopram]     Metoprolol     Metronidazole     Olmesartan      Other reaction(s): Dizziness    Pantoprazole     Penicillins     Pilocarpine Hydrochloride [Pilocarpine]      Opthalmic vitamin B-12 (CYANOCOBALAMIN) 1000 MCG tablet Take 1,000 mcg by mouth daily      psyllium (KONSYL) 28.3 % PACK Take 1 packet by mouth daily      melatonin 3 MG TABS tablet Take 3 mg by mouth nightly as needed           Medications patient taking as of now reconciled against medications ordered at time of hospital discharge: Yes    Chief Complaint   Patient presents with    [facility] [address] [facility] for right leg. I think I am doing ok, its just been a week ago today. needs to discuss plavix and folic acid scripts and dosaging. Is doing the wound care. Is going every 2 weeks for the right great toe. Had a balloon angioplasty in her right leg and CO2 aortogram-- the aorta and the iliacs were patent- down the leg was significant stenosis 75-80%. sucessfully opened. Inpatient course: Discharge summary reviewed- see chart. Interval history/Current status: Is testing her sugars 4 times daily since she has been home from the hospital again. Taking her tresiba daily and then the novalog sliding scale with her bolus once daily. ! 0 units plus the sliding scale. Has the visiting nurse coming out on Monday Weds and Friday to check the vitals and the pulses and redress her wound. Vitals:    02/14/20 1023   BP: 126/82   Pulse: 66   SpO2: 97%   Weight: 218 lb 6.4 oz (99.1 kg)     Body mass index is 33.68 kg/m². Wt Readings from Last 3 Encounters:   02/14/20 218 lb 6.4 oz (99.1 kg)   01/09/20 212 lb 14.4 oz (96.6 kg)   12/12/19 217 lb (98.4 kg)     BP Readings from Last 3 Encounters:   02/14/20 126/82   01/09/20 114/66   12/12/19 132/66       Review of Systems    Physical Exam  Vitals signs and nursing note reviewed. Constitutional:       Appearance: She is well-developed. HENT:      Head: Normocephalic.       Right Ear: External ear normal.      Left Ear: External ear normal.      Nose: Nose normal.      Mouth/Throat:      Pharynx: No oropharyngeal exudate or posterior

## 2020-03-02 ENCOUNTER — OFFICE VISIT (OUTPATIENT)
Dept: FAMILY MEDICINE CLINIC | Age: 85
End: 2020-03-02
Payer: MEDICARE

## 2020-03-02 VITALS
BODY MASS INDEX: 33 KG/M2 | WEIGHT: 214 LBS | DIASTOLIC BLOOD PRESSURE: 74 MMHG | SYSTOLIC BLOOD PRESSURE: 134 MMHG | OXYGEN SATURATION: 98 % | HEART RATE: 70 BPM

## 2020-03-02 LAB — HBA1C MFR BLD: 7.5 %

## 2020-03-02 PROCEDURE — G8427 DOCREV CUR MEDS BY ELIG CLIN: HCPCS | Performed by: FAMILY MEDICINE

## 2020-03-02 PROCEDURE — G8482 FLU IMMUNIZE ORDER/ADMIN: HCPCS | Performed by: FAMILY MEDICINE

## 2020-03-02 PROCEDURE — 1036F TOBACCO NON-USER: CPT | Performed by: FAMILY MEDICINE

## 2020-03-02 PROCEDURE — 1123F ACP DISCUSS/DSCN MKR DOCD: CPT | Performed by: FAMILY MEDICINE

## 2020-03-02 PROCEDURE — G8400 PT W/DXA NO RESULTS DOC: HCPCS | Performed by: FAMILY MEDICINE

## 2020-03-02 PROCEDURE — 4040F PNEUMOC VAC/ADMIN/RCVD: CPT | Performed by: FAMILY MEDICINE

## 2020-03-02 PROCEDURE — 83036 HEMOGLOBIN GLYCOSYLATED A1C: CPT | Performed by: FAMILY MEDICINE

## 2020-03-02 PROCEDURE — G8417 CALC BMI ABV UP PARAM F/U: HCPCS | Performed by: FAMILY MEDICINE

## 2020-03-02 PROCEDURE — 3051F HG A1C>EQUAL 7.0%<8.0%: CPT | Performed by: FAMILY MEDICINE

## 2020-03-02 PROCEDURE — 1090F PRES/ABSN URINE INCON ASSESS: CPT | Performed by: FAMILY MEDICINE

## 2020-03-02 PROCEDURE — 99214 OFFICE O/P EST MOD 30 MIN: CPT | Performed by: FAMILY MEDICINE

## 2020-03-02 PROCEDURE — 99212 OFFICE O/P EST SF 10 MIN: CPT

## 2020-03-02 RX ORDER — MULTIVITAMIN WITH IRON
100 TABLET ORAL DAILY
COMMUNITY

## 2020-03-02 NOTE — PROGRESS NOTES
HYSTERECTOMY      PACEMAKER INSERTION         Family History   Problem Relation Age of Onset    Diabetes Mother     Heart Disease Father     Lung Cancer Sister        Social History     Tobacco Use    Smoking status: Never Smoker    Smokeless tobacco: Never Used   Substance Use Topics    Alcohol use: No      Current Outpatient Medications   Medication Sig Dispense Refill    vitamin B-6 (PYRIDOXINE) 100 MG tablet Take 100 mg by mouth daily      clopidogrel (PLAVIX) 75 MG tablet Take 1 tablet by mouth daily 90 tablet 1    nebivolol (BYSTOLIC) 5 MG tablet Take 5 mg by mouth      furosemide (LASIX) 40 MG tablet Take 1 tablet by mouth daily 30 tablet 5    atorvastatin (LIPITOR) 40 MG tablet TAKE 1 TABLET ONCE A DAY 30 tablet 5    acetaminophen (TYLENOL) 500 MG tablet Take 500 mg by mouth every 6 hours as needed for Pain      folic acid (FOLVITE) 1 MG tablet Take 1 mg by mouth daily      Insulin Degludec (TRESIBA FLEXTOUCH) 100 UNIT/ML SOPN Inject 35 Units into the skin nightly 15 pen 3    insulin aspart (NOVOLOG FLEXPEN) 100 UNIT/ML injection pen 10 units before biggest meal and also sliding scale 16 pen 3    DULoxetine (CYMBALTA) 60 MG extended release capsule TAKE 1 CAPSULE DAILY 90 capsule 3    latanoprost (XALATAN) 0.005 % ophthalmic solution 1 drop nightly      cetirizine (ZYRTEC) 10 MG tablet Take 1 tablet by mouth daily 90 tablet 1    azelastine HCl 0.15 % SOLN azelastine 0.15 % (205.5 mcg) nasal spray      apixaban (ELIQUIS) 2.5 MG TABS tablet Take 1 tablet by mouth 2 times daily 180 tablet 3    vitamin B-12 (CYANOCOBALAMIN) 1000 MCG tablet Take 1,000 mcg by mouth daily      psyllium (KONSYL) 28.3 % PACK Take 1 packet by mouth daily      melatonin 3 MG TABS tablet Take 3 mg by mouth nightly as needed       Insulin Pen Needle 31G X 5 MM MISC 1 each by Does not apply route 2 times daily 300 each 3    benzonatate (TESSALON) 100 MG capsule benzonatate 100 mg capsule   Take 1 capsule 3 times a day by oral route as needed.  Handicap Placard MISC by Does not apply route The disability is expected to last lifetime  Dx: knee pain 1 each 0     No current facility-administered medications for this visit. Allergies   Allergen Reactions    Cilostazol      Chest quivers and increased blood sugars  Elevated blood sugar    Ace Inhibitors      HYPERKALEMIA (even low dose)    Amitriptyline     Amlodipine      Other reaction(s): Dizziness    Ammonium Hydroxide     Amoxicillin     Avandia [Rosiglitazone]     Benzalkonium Chloride     Bifidobacterium     Brimonidine Tartrate     Cephalexin     Codeine     Diclofenac     Dorzolamide     Etodolac     Feldene [Piroxicam]     Lactobacillus     Levaquin [Levofloxacin In D5w]      Sweating, irritable.  Lexapro [Escitalopram]     Metoprolol     Metronidazole     Olmesartan      Other reaction(s): Dizziness    Pantoprazole     Penicillins     Pilocarpine Hydrochloride [Pilocarpine]      Opthalmic solution.  Gives headache      Prednisone     Propoxyphene      Darvocet    Qvar [Beclomethasone] Swelling    Streptococci     Travoprost     Vasotec [Enalapril Maleate]     Vicodin [Hydrocodone-Acetaminophen]     Voltaren [Diclofenac Sodium]        Health Maintenance   Topic Date Due    Hepatitis B vaccine (1 of 3 - Risk 3-dose series) 09/20/1954    DTaP/Tdap/Td vaccine (1 - Tdap) 09[birth year]20/1955    Shingles Vaccine (1 of 2) 09/20/1986    DEXA (modify frequency per FRAX score)  09/20/2001    Annual Wellness Visit (AWV)  05/29/2019    Lipid screen  08/15/2019    Potassium monitoring  02/06/2021    Creatinine monitoring  02/06/2021    Flu vaccine  Completed    Pneumococcal 65+ years Vaccine  Completed    Hepatitis A vaccine  Aged Out    Hib vaccine  Aged Out    Meningococcal (ACWY) vaccine  Aged Out       Subjective:      Review of Systems    Objective:     /74 (Site: Right Upper Arm, Position: Sitting, Cuff Size: Large Adult)

## 2020-04-17 RX ORDER — CLOPIDOGREL BISULFATE 75 MG/1
75 TABLET ORAL DAILY
Qty: 90 TABLET | Refills: 1 | Status: SHIPPED | OUTPATIENT
Start: 2020-04-17 | End: 2020-09-10 | Stop reason: SDUPTHER

## 2020-04-17 RX ORDER — FUROSEMIDE 40 MG/1
40 TABLET ORAL DAILY
Qty: 30 TABLET | Refills: 5 | Status: SHIPPED | OUTPATIENT
Start: 2020-04-17 | End: 2020-08-26

## 2020-04-20 ENCOUNTER — TELEPHONE (OUTPATIENT)
Dept: FAMILY MEDICINE CLINIC | Age: 85
End: 2020-04-20

## 2020-06-03 ENCOUNTER — OFFICE VISIT (OUTPATIENT)
Dept: FAMILY MEDICINE CLINIC | Age: 85
End: 2020-06-03
Payer: MEDICARE

## 2020-06-03 VITALS
SYSTOLIC BLOOD PRESSURE: 134 MMHG | OXYGEN SATURATION: 98 % | WEIGHT: 212 LBS | BODY MASS INDEX: 32.69 KG/M2 | DIASTOLIC BLOOD PRESSURE: 66 MMHG | HEART RATE: 71 BPM

## 2020-06-03 LAB — HBA1C MFR BLD: 6.9 %

## 2020-06-03 PROCEDURE — G8427 DOCREV CUR MEDS BY ELIG CLIN: HCPCS | Performed by: FAMILY MEDICINE

## 2020-06-03 PROCEDURE — 99214 OFFICE O/P EST MOD 30 MIN: CPT | Performed by: FAMILY MEDICINE

## 2020-06-03 PROCEDURE — G8400 PT W/DXA NO RESULTS DOC: HCPCS | Performed by: FAMILY MEDICINE

## 2020-06-03 PROCEDURE — 4040F PNEUMOC VAC/ADMIN/RCVD: CPT | Performed by: FAMILY MEDICINE

## 2020-06-03 PROCEDURE — G8417 CALC BMI ABV UP PARAM F/U: HCPCS | Performed by: FAMILY MEDICINE

## 2020-06-03 PROCEDURE — 1123F ACP DISCUSS/DSCN MKR DOCD: CPT | Performed by: FAMILY MEDICINE

## 2020-06-03 PROCEDURE — 1036F TOBACCO NON-USER: CPT | Performed by: FAMILY MEDICINE

## 2020-06-03 PROCEDURE — 99213 OFFICE O/P EST LOW 20 MIN: CPT

## 2020-06-03 PROCEDURE — 1090F PRES/ABSN URINE INCON ASSESS: CPT | Performed by: FAMILY MEDICINE

## 2020-06-03 PROCEDURE — 83036 HEMOGLOBIN GLYCOSYLATED A1C: CPT | Performed by: FAMILY MEDICINE

## 2020-06-03 NOTE — PROGRESS NOTES
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 ([facility]     Glaucoma     Hyperlipidemia     Hypertension     Pacemaker, artificial     Peripheral vascular disease of lower extremity ([facility] 12/17/2018    Type II diabetes mellitus, uncontrolled ([facility]       Past Surgical History:   Procedure Laterality Date    CHOLECYSTECTOMY      COLONOSCOPY      EYE SURGERY      HYSTERECTOMY      PACEMAKER INSERTION         Family History   Problem Relation Age of Onset    Diabetes Mother     Heart Disease Father     Lung Cancer Sister        Social History     Tobacco Use    Smoking status: Never Smoker    Smokeless tobacco: Never Used   Substance Use Topics    Alcohol use: No      Current Outpatient Medications   Medication Sig Dispense Refill    Elastic Bandages & Supports (WRIST SPLINT) MISC 2 each by Does not apply route nightly BILATERALLY 2 each 0    clopidogrel (PLAVIX) 75 MG tablet Take 1 tablet by mouth daily 90 tablet 1    furosemide (LASIX) 40 MG tablet Take 1 tablet by mouth daily 30 tablet 5    vitamin B-6 (PYRIDOXINE) 100 MG tablet Take 100 mg by mouth daily      nebivolol (BYSTOLIC) 5 MG tablet Take 5 mg by mouth      atorvastatin (LIPITOR) 40 MG tablet TAKE 1 TABLET ONCE A DAY 30 tablet 5    acetaminophen (TYLENOL) 500 MG tablet Take 500 mg by mouth every 6 hours as needed for Pain      folic acid (FOLVITE) 1 MG tablet Take 1 mg by mouth daily      insulin aspart (NOVOLOG FLEXPEN) 100 UNIT/ML injection pen 10 units before biggest meal and also sliding scale 16 pen 3    benzonatate (TESSALON) 100 MG capsule benzonatate 100 mg capsule   Take 1 capsule 3 times a day by oral route as needed.       DULoxetine (CYMBALTA) 60 MG extended release capsule TAKE 1 CAPSULE DAILY 90 capsule 3    latanoprost (XALATAN) 0.005 % ophthalmic solution 1 drop nightly      cetirizine (ZYRTEC) 10 MG tablet Take 1 tablet by mouth daily 90 Capillary refill takes less than 2 seconds. Findings: No erythema. Neurological:      General: No focal deficit present. Mental Status: She is alert and oriented to person, place, and time. Cranial Nerves: No cranial nerve deficit. Psychiatric:         Mood and Affect: Mood normal.         Behavior: Behavior normal.         Thought Content: Thought content normal.         Judgment: Judgment normal.         Assessment/Plan:      Diagnosis Orders   1. Type 2 diabetes mellitus with diabetic nephropathy, with long-term current use of insulin ([facility])  POCT glycosylated hemoglobin (Hb A1C) diabetes is very well controlled at this time. Continue with her current baseline medications with the sliding scale. 2. Bilateral carpal tunnel syndrome  Elastic Bandages & Supports (WRIST SPLINT) [facility] we will going to try the bilateral carpal tunnel splints. Suspect she also has an element of mild neuropathy particularly in the left hand. Hopefully this will help could consider steroid injection in the wrist if she continues to have significant symptoms. 3. Chronic combined systolic and diastolic congestive heart failure ([address]  well controlled at this time no changes in her current medications. 4. SSS (sick sinus syndrome) ([address]   status post pacemaker insertion doing quite well. Lab Results   Component Value Date    WBC 8.2 05/22/2020    HGB 12.4 05/22/2020    HCT 39.4 05/22/2020     05/22/2020    CHOL 136 08/15/2018    TRIG 114 08/15/2018    ALT 22 08/15/2018    AST 15 08/15/2018     05/22/2020    K 4.6 05/22/2020     05/22/2020    CREATININE 1.70 (H) 05/22/2020    BUN 37 (H) 05/22/2020    CO2 27 05/22/2020    TSH 0.775 03/02/2018    INR 1.32 (H) 02/06/2020    LABA1C 6.9 06/03/2020    LABA1C 7.5 03/02/2020    LABA1C 7.4 12/12/2019       Return in about 3 months (around 9/3/2020) for DM follow up, Medication recheck.             Patient given educational materials - see patientinstructions. Discussed use, benefit, and side effects of prescribed medications. All patient questions answered. Pt voiced understanding. Reviewed health maintenance. Instructed to continue current medications, diet andexercise. Patient agreed with treatment plan. Follow up as directed.      Electronically signed by Ulises Olmos MD on 6/7/2020

## 2020-06-07 PROBLEM — H35.3131 EARLY DRY STAGE NONEXUDATIVE AGE-RELATED MACULAR DEGENERATION OF BOTH EYES: Status: ACTIVE | Noted: 2020-06-01

## 2020-06-07 PROBLEM — I49.5 SSS (SICK SINUS SYNDROME) (HCC): Status: ACTIVE | Noted: 2020-06-07

## 2020-06-10 ENCOUNTER — TELEPHONE (OUTPATIENT)
Dept: FAMILY MEDICINE CLINIC | Age: 85
End: 2020-06-10

## 2020-06-16 RX ORDER — INSULIN ASPART 100 [IU]/ML
INJECTION, SOLUTION INTRAVENOUS; SUBCUTANEOUS
Qty: 16 PEN | Refills: 3 | Status: SHIPPED | OUTPATIENT
Start: 2020-06-16 | End: 2021-04-05

## 2020-06-22 NOTE — TELEPHONE ENCOUNTER
Rafael Sniderice is requesting a refill on the following medication(s):  Requested Prescriptions     Pending Prescriptions Disp Refills    DULoxetine (CYMBALTA) 60 MG extended release capsule 90 capsule 3     Sig: Take 1 capsule by mouth daily       Last Visit Date (If Applicable):  7/8/8793    Next Visit Date:    9/10/2020

## 2020-06-23 RX ORDER — DULOXETIN HYDROCHLORIDE 60 MG/1
60 CAPSULE, DELAYED RELEASE ORAL DAILY
Qty: 90 CAPSULE | Refills: 3 | Status: SHIPPED | OUTPATIENT
Start: 2020-06-23 | End: 2021-01-01

## 2020-07-16 NOTE — TELEPHONE ENCOUNTER
Alka Hooker is requesting a refill on the following medication(s):  Requested Prescriptions     Pending Prescriptions Disp Refills    B-D UF III MINI PEN NEEDLES 31G X 5 MM MISC [Pharmacy Med Name: BD MINI NDL  PEN [id number]] 100 each 3     Sig: USE AND DISCARD 1 PEN      NEEDLE TWO TIMES A DAY       Last Visit Date (If Applicable):  8/3/8747    Next Visit Date:    9/10/2020

## 2020-07-17 RX ORDER — PEN NEEDLE, DIABETIC 31 GX5/16"
NEEDLE, DISPOSABLE MISCELLANEOUS
Qty: 100 EACH | Refills: 3 | Status: SHIPPED | OUTPATIENT
Start: 2020-07-17 | End: 2020-07-30 | Stop reason: SDUPTHER

## 2020-07-24 ENCOUNTER — TELEPHONE (OUTPATIENT)
Dept: FAMILY MEDICINE CLINIC | Age: 85
End: 2020-07-24

## 2020-07-30 NOTE — TELEPHONE ENCOUNTER
Patient needs rx sent to [facility] for the freestyle sherice. SHe also needs updated rx for pen needles.

## 2020-07-31 RX ORDER — FLASH GLUCOSE SENSOR
1 KIT MISCELLANEOUS DAILY
Qty: 2 EACH | Refills: 12 | Status: SHIPPED | OUTPATIENT
Start: 2020-07-31

## 2020-07-31 RX ORDER — PEN NEEDLE, DIABETIC 31 GX5/16"
1 NEEDLE, DISPOSABLE MISCELLANEOUS 3 TIMES DAILY
Qty: 100 EACH | Refills: 3 | Status: SHIPPED | OUTPATIENT
Start: 2020-07-31 | End: 2020-07-31 | Stop reason: SDUPTHER

## 2020-07-31 RX ORDER — PEN NEEDLE, DIABETIC 31 GX5/16"
1 NEEDLE, DISPOSABLE MISCELLANEOUS 3 TIMES DAILY
Qty: 100 EACH | Refills: 3 | Status: SHIPPED | OUTPATIENT
Start: 2020-07-31 | End: 2020-12-03 | Stop reason: SDUPTHER

## 2020-07-31 RX ORDER — FLASH GLUCOSE SENSOR
1 KIT MISCELLANEOUS DAILY
Qty: 2 EACH | Refills: 12 | Status: SHIPPED | OUTPATIENT
Start: 2020-07-31 | End: 2020-07-31 | Stop reason: SDUPTHER

## 2020-08-03 ENCOUNTER — TELEPHONE (OUTPATIENT)
Dept: FAMILY MEDICINE CLINIC | Age: 85
End: 2020-08-03

## 2020-08-03 NOTE — TELEPHONE ENCOUNTER
Patient called stating she was really tired over the weekend. She is having some discomfort with urination. Visiting nurse checked her temp today and she was 99.  She wants to know if you will order a urine test.

## 2020-08-05 ENCOUNTER — HOSPITAL ENCOUNTER (OUTPATIENT)
Age: 85
Setting detail: SPECIMEN
Discharge: HOME | End: 2020-08-05
Payer: MEDICARE

## 2020-08-05 LAB
-: ABNORMAL
AMORPHOUS: ABNORMAL
BACTERIA: ABNORMAL
BILIRUBIN URINE: NEGATIVE
CASTS UA: ABNORMAL /LPF (ref 0–2)
COLOR: ABNORMAL
COMMENT UA: ABNORMAL
CRYSTALS, UA: ABNORMAL /HPF
EPITHELIAL CELLS UA: ABNORMAL /HPF (ref 0–5)
GLUCOSE URINE: NEGATIVE
KETONES, URINE: NEGATIVE
LEUKOCYTE ESTERASE, URINE: ABNORMAL
MUCUS: ABNORMAL
NITRITE, URINE: NEGATIVE
OTHER OBSERVATIONS UA: ABNORMAL
PH UA: 6 (ref 5–6)
PROTEIN UA: NEGATIVE
RBC UA: ABNORMAL /HPF (ref 0–4)
RENAL EPITHELIAL, UA: ABNORMAL /HPF
SPECIFIC GRAVITY UA: 1 (ref 1.01–1.02)
TRICHOMONAS: ABNORMAL
TURBIDITY: ABNORMAL
URINE HGB: NEGATIVE
UROBILINOGEN, URINE: NORMAL
WBC UA: ABNORMAL /HPF (ref 0–4)
YEAST: ABNORMAL

## 2020-08-05 PROCEDURE — 87086 URINE CULTURE/COLONY COUNT: CPT

## 2020-08-05 PROCEDURE — 81001 URINALYSIS AUTO W/SCOPE: CPT

## 2020-08-07 LAB
CULTURE: NO GROWTH
Lab: NORMAL
SPECIMEN DESCRIPTION: NORMAL

## 2020-08-25 NOTE — TELEPHONE ENCOUNTER
Toño Wang is requesting a refill on the following medication(s):  Requested Prescriptions     Pending Prescriptions Disp Refills    furosemide (LASIX) 40 MG tablet [Pharmacy Med Name: FUROSEMIDE 40 MG TABLET] 30 tablet 5     Sig: take 1 tablet by mouth once daily       Last Visit Date (If Applicable):  Visit date not found    Next Visit Date:    Visit date not found

## 2020-08-26 RX ORDER — FUROSEMIDE 40 MG/1
TABLET ORAL
Qty: 30 TABLET | Refills: 5 | Status: SHIPPED | OUTPATIENT
Start: 2020-08-26 | End: 2021-01-04 | Stop reason: SDUPTHER

## 2020-08-31 ENCOUNTER — TELEPHONE (OUTPATIENT)
Dept: FAMILY MEDICINE CLINIC | Age: 85
End: 2020-08-31

## 2020-08-31 NOTE — TELEPHONE ENCOUNTER
Patient wants to know if you will review her meds and see if there is anything that she can stop taking. She thinks she is on too much medication. She has an appointment coming up but does not want to wait until that time and does not want to come in sooner. She also wants to know if there is any blood work that you want done. She has labs to do for another provider and she would like to get them done at the same time.

## 2020-08-31 NOTE — TELEPHONE ENCOUNTER
I did put in some blood work. It is probably got some overlap from what the other provider ordered but that we make sure breathing is been done that I want on. Unfortunately I did review her medication list again and at this time there is not any medication that I feel she is able to discontinue.

## 2020-09-01 ENCOUNTER — HOSPITAL ENCOUNTER (OUTPATIENT)
Age: 85
Setting detail: SPECIMEN
Discharge: HOME | End: 2020-09-01
Payer: MEDICARE

## 2020-09-01 ENCOUNTER — TELEPHONE (OUTPATIENT)
Dept: FAMILY MEDICINE CLINIC | Age: 85
End: 2020-09-01

## 2020-09-01 LAB
ALBUMIN SERPL-MCNC: 3.4 G/DL (ref 3.5–5.2)
ALBUMIN/GLOBULIN RATIO: 0.9 (ref 1–2.5)
ALP BLD-CCNC: 94 U/L (ref 35–104)
ALT SERPL-CCNC: 17 U/L (ref 5–33)
ANION GAP SERPL CALCULATED.3IONS-SCNC: 11 MMOL/L (ref 9–17)
ANION GAP SERPL CALCULATED.3IONS-SCNC: 11 MMOL/L (ref 9–17)
AST SERPL-CCNC: 21 U/L
BILIRUB SERPL-MCNC: 0.55 MG/DL (ref 0.3–1.2)
BUN BLDV-MCNC: 42 MG/DL (ref 8–23)
BUN BLDV-MCNC: 42 MG/DL (ref 8–23)
BUN/CREAT BLD: 26 (ref 9–20)
BUN/CREAT BLD: 26 (ref 9–20)
CALCIUM SERPL-MCNC: 9 MG/DL (ref 8.6–10.4)
CALCIUM SERPL-MCNC: 9 MG/DL (ref 8.6–10.4)
CHLORIDE BLD-SCNC: 103 MMOL/L (ref 98–107)
CHLORIDE BLD-SCNC: 103 MMOL/L (ref 98–107)
CHOLESTEROL/HDL RATIO: 1.8
CHOLESTEROL: 76 MG/DL
CO2: 25 MMOL/L (ref 20–31)
CO2: 25 MMOL/L (ref 20–31)
CREAT SERPL-MCNC: 1.64 MG/DL (ref 0.5–0.9)
CREAT SERPL-MCNC: 1.64 MG/DL (ref 0.5–0.9)
GFR AFRICAN AMERICAN: 36 ML/MIN
GFR AFRICAN AMERICAN: 36 ML/MIN
GFR NON-AFRICAN AMERICAN: 30 ML/MIN
GFR NON-AFRICAN AMERICAN: 30 ML/MIN
GFR SERPL CREATININE-BSD FRML MDRD: ABNORMAL ML/MIN/{1.73_M2}
GLUCOSE BLD-MCNC: 230 MG/DL (ref 70–99)
GLUCOSE BLD-MCNC: 230 MG/DL (ref 70–99)
HCT VFR BLD CALC: 37.1 % (ref 36.3–47.1)
HDLC SERPL-MCNC: 43 MG/DL
HEMOGLOBIN: 11.5 G/DL (ref 11.9–15.1)
LDL CHOLESTEROL: 16 MG/DL (ref 0–130)
MAGNESIUM: 2.1 MG/DL (ref 1.6–2.6)
MCH RBC QN AUTO: 32.1 PG (ref 25.2–33.5)
MCHC RBC AUTO-ENTMCNC: 31 G/DL (ref 25.2–33.5)
MCV RBC AUTO: 103.6 FL (ref 82.6–102.9)
NRBC AUTOMATED: 0 PER 100 WBC
PDW BLD-RTO: 13.1 % (ref 11.8–14.4)
PHOSPHORUS: 3.7 MG/DL (ref 2.6–4.5)
PLATELET # BLD: 218 K/UL (ref 138–453)
PMV BLD AUTO: 11.5 FL (ref 8.1–13.5)
POTASSIUM SERPL-SCNC: 4.6 MMOL/L (ref 3.7–5.3)
POTASSIUM SERPL-SCNC: 4.6 MMOL/L (ref 3.7–5.3)
RBC # BLD: 3.58 M/UL (ref 3.95–5.11)
SODIUM BLD-SCNC: 139 MMOL/L (ref 135–144)
SODIUM BLD-SCNC: 139 MMOL/L (ref 135–144)
TOTAL PROTEIN: 7 G/DL (ref 6.4–8.3)
TRIGL SERPL-MCNC: 83 MG/DL
VLDLC SERPL CALC-MCNC: NORMAL MG/DL (ref 1–30)
WBC # BLD: 7.3 K/UL (ref 3.5–11.3)

## 2020-09-01 PROCEDURE — 80053 COMPREHEN METABOLIC PANEL: CPT

## 2020-09-01 PROCEDURE — 36415 COLL VENOUS BLD VENIPUNCTURE: CPT

## 2020-09-01 PROCEDURE — 83735 ASSAY OF MAGNESIUM: CPT

## 2020-09-01 PROCEDURE — 85027 COMPLETE CBC AUTOMATED: CPT

## 2020-09-01 PROCEDURE — 80061 LIPID PANEL: CPT

## 2020-09-01 PROCEDURE — 84100 ASSAY OF PHOSPHORUS: CPT

## 2020-09-10 ENCOUNTER — HOSPITAL ENCOUNTER (OUTPATIENT)
Age: 85
Setting detail: SPECIMEN
Discharge: HOME | End: 2020-09-10
Payer: MEDICARE

## 2020-09-10 ENCOUNTER — OFFICE VISIT (OUTPATIENT)
Dept: FAMILY MEDICINE CLINIC | Age: 85
End: 2020-09-10
Payer: MEDICARE

## 2020-09-10 VITALS
WEIGHT: 212 LBS | HEART RATE: 79 BPM | BODY MASS INDEX: 32.69 KG/M2 | OXYGEN SATURATION: 100 % | DIASTOLIC BLOOD PRESSURE: 68 MMHG | SYSTOLIC BLOOD PRESSURE: 128 MMHG

## 2020-09-10 LAB
CREATININE URINE: 28.4 MG/DL (ref 28–217)
HBA1C MFR BLD: 6.7 %

## 2020-09-10 PROCEDURE — 1123F ACP DISCUSS/DSCN MKR DOCD: CPT | Performed by: FAMILY MEDICINE

## 2020-09-10 PROCEDURE — 4040F PNEUMOC VAC/ADMIN/RCVD: CPT | Performed by: FAMILY MEDICINE

## 2020-09-10 PROCEDURE — 82570 ASSAY OF URINE CREATININE: CPT

## 2020-09-10 PROCEDURE — G0008 ADMIN INFLUENZA VIRUS VAC: HCPCS | Performed by: FAMILY MEDICINE

## 2020-09-10 PROCEDURE — G8417 CALC BMI ABV UP PARAM F/U: HCPCS | Performed by: FAMILY MEDICINE

## 2020-09-10 PROCEDURE — 83036 HEMOGLOBIN GLYCOSYLATED A1C: CPT | Performed by: FAMILY MEDICINE

## 2020-09-10 PROCEDURE — 1036F TOBACCO NON-USER: CPT | Performed by: FAMILY MEDICINE

## 2020-09-10 PROCEDURE — 99214 OFFICE O/P EST MOD 30 MIN: CPT | Performed by: FAMILY MEDICINE

## 2020-09-10 PROCEDURE — 99213 OFFICE O/P EST LOW 20 MIN: CPT

## 2020-09-10 PROCEDURE — G8400 PT W/DXA NO RESULTS DOC: HCPCS | Performed by: FAMILY MEDICINE

## 2020-09-10 PROCEDURE — G8427 DOCREV CUR MEDS BY ELIG CLIN: HCPCS | Performed by: FAMILY MEDICINE

## 2020-09-10 PROCEDURE — 1090F PRES/ABSN URINE INCON ASSESS: CPT | Performed by: FAMILY MEDICINE

## 2020-09-10 RX ORDER — CLOPIDOGREL BISULFATE 75 MG/1
75 TABLET ORAL DAILY
Qty: 90 TABLET | Refills: 1 | Status: SHIPPED | OUTPATIENT
Start: 2020-09-10 | End: 2021-01-04 | Stop reason: SDUPTHER

## 2020-09-10 RX ORDER — VITAMIN E 200 UNIT
CAPSULE ORAL
COMMUNITY
End: 2020-11-25 | Stop reason: ALTCHOICE

## 2020-09-10 ASSESSMENT — PATIENT HEALTH QUESTIONNAIRE - PHQ9
SUM OF ALL RESPONSES TO PHQ QUESTIONS 1-9: 2
SUM OF ALL RESPONSES TO PHQ QUESTIONS 1-9: 2
2. FEELING DOWN, DEPRESSED OR HOPELESS: 2
1. LITTLE INTEREST OR PLEASURE IN DOING THINGS: 0
SUM OF ALL RESPONSES TO PHQ9 QUESTIONS 1 & 2: 2

## 2020-09-10 NOTE — PATIENT INSTRUCTIONS
Take 1 tablespoon of [place] milk of magnesia nightly for up to 3 nights to get the bowels moving. After getting the bowels moving can take a fiber gummie or two daily to keep moving. Can also use the prunes daily to keep moving. Continue with the Omega red for her vision if this is helping.

## 2020-09-10 NOTE — PROGRESS NOTES
[address] [facility]  [address]  Dept: [phone]  Dept BAU:[phone]    Christiano Simon is a [address] y.o. female who presents today for her medical conditions/complaints as notedbelow. Christiano Simon is c/o of Diabetes (3mo follow up); Discuss Medications (she wants to get off some medications. She thinks that her eyes and mind are not working together. She has a hard time seeing the TV some times. She describes it as a fuzzy mind and eyes.); Rash (spot on left leg that will not go away, dry skin on ankle); and Constipation (states she has not had a bowel movement since 9/4.)      HPI:     HPI  Her blood sugars have been adequately controlled on her current regimen. Did review the sugars today. She has not had any episodes of hypo glycemia. She is currently checking her blood sugars 4 times daily as best she can. She has been checking the blood sugars for the last 40 years and this is caused her fingers to become quite callused and it is very difficult for keeping her blood out at this point. Sometimes has to actually poke her fingers 4 times for 1 blood sugar check to get the blood out. We have attempted to cut down the number of blood sugar checks but unfortunately her A1c rises rapidly when we I have attempted to go to once or twice daily bolusing or fewer checks and not using the sliding scale. Her sugars currently well controlled with her current dosing regimen. Her eyes really bother her a lot. Started taking her Omega Red and this seemed to help a little bit. Would like to continue this. Feels like her brain and her eyes are \"fuzzy\". Footing has been unsteady for a long time. Sleep is all out of wack. Is taking melatonin all the time now. Thinks this helps. 5 mg daily. Is using the saline drops regularly with her eyes also not sure if this has helped at all.     Does use the water pill regularly-- did use the water pill on 2 days extra dose to see if this helped with the weight gain and it has. Has not had a BM since 9/3/2020. Before for that had had a problem already. Her hip is getting better. About 6 weeks ago,slipped down getting up,     Is using the cane and the walker most of the time. Seeing her kidney Dr Tra Spence) next week as well as cardiology- had the echo last week. EF 40-45%  MR is moderate, stable maybe a bit better.       BP Readings from Last 3 Encounters:   09/10/20 128/68   06/03/20 134/66   03/02/20 134/74          (goal 120/80)    Wt Readings from Last 3 Encounters:   09/10/20 212 lb (96.2 kg)   06/03/20 212 lb (96.2 kg)   03/02/20 214 lb (97.1 kg)        Past Medical History:   Diagnosis Date    Anxiety     Carotid stenosis, bilateral     Cerebrovascular disease     Chronic kidney disease, stage 3 ([address]     Emphysema of lung ([address]     Glaucoma     Hyperlipidemia     Hypertension     Pacemaker, artificial     Peripheral vascular disease of lower extremity ([address] 12/17/2018    Type II diabetes mellitus, uncontrolled ([address]       Past Surgical History:   Procedure Laterality Date    CHOLECYSTECTOMY      COLONOSCOPY      EYE SURGERY      HYSTERECTOMY      PACEMAKER INSERTION         Family History   Problem Relation Age of Onset    Diabetes Mother     Heart Disease Father     Lung Cancer Sister        Social History     Tobacco Use    Smoking status: Never Smoker    Smokeless tobacco: Never Used   Substance Use Topics    Alcohol use: No      Current Outpatient Medications   Medication Sig Dispense Refill    MegaRed Omega-3 Krill Oil 500 MG CAPS Take by mouth      clopidogrel (PLAVIX) 75 MG tablet Take 1 tablet by mouth daily 90 tablet 1    furosemide (LASIX) 40 MG tablet take 1 tablet by mouth once daily 30 tablet 5    DULoxetine (CYMBALTA) 60 MG extended release capsule Take 1 capsule by mouth daily 90 capsule 3    insulin aspart (NOVOLOG FLEXPEN) 100 UNIT/ML injection pen 10 units before biggest meal and also sliding scale 16 pen 3    vitamin B-6 (PYRIDOXINE) 100 MG tablet Take 100 mg by mouth daily      nebivolol (BYSTOLIC) 5 MG tablet Take 5 mg by mouth      atorvastatin (LIPITOR) 40 MG tablet TAKE 1 TABLET ONCE A DAY 30 tablet 5    acetaminophen (TYLENOL) 500 MG tablet Take 500 mg by mouth every 6 hours as needed for Pain      folic acid (FOLVITE) 1 MG tablet Take 1 mg by mouth daily      Insulin Degludec (TRESIBA FLEXTOUCH) 100 UNIT/ML SOPN Inject 35 Units into the skin nightly 15 pen 3    latanoprost (XALATAN) 0.005 % ophthalmic solution 1 drop nightly      apixaban (ELIQUIS) 2.5 MG TABS tablet Take 1 tablet by mouth 2 times daily 180 tablet 3    vitamin B-12 (CYANOCOBALAMIN) 1000 MCG tablet Take 1,000 mcg by mouth daily      melatonin 3 MG TABS tablet Take 3 mg by mouth nightly as needed       Continuous Blood Gluc  (FREESTYLE KAHLIL 2 READER SYSTM) DANIEL 1 each by Does not apply route 4 times daily Test blood sugar 4 times daily 2 Device 12    Insulin Pen Needle (B-D UF III MINI PEN NEEDLES) 31G X 5 MM MISC Inject 1 each into the skin 3 times daily 100 each 3    Continuous Blood Gluc Sensor (FREESTYLE KAHLIL 14 DAY SENSOR) MISC 1 each by Does not apply route daily Tests blood sugar 4 times and as needed for fluctuating blood sugars 2 each 12    Elastic Bandages & Supports (WRIST SPLINT) MISC 2 each by Does not apply route nightly BILATERALLY 2 each 0    benzonatate (TESSALON) 100 MG capsule benzonatate 100 mg capsule   Take 1 capsule 3 times a day by oral route as needed.  Handicap Placard MISC by Does not apply route The disability is expected to last lifetime  Dx: knee pain 1 each 0    psyllium (KONSYL) 28.3 % PACK Take 1 packet by mouth daily       No current facility-administered medications for this visit.       Allergies   Allergen Reactions    Cilostazol      Chest quivers and increased blood sugars  Elevated blood sugar    Ace Inhibitors HYPERKALEMIA (even low dose)    Amitriptyline     Amlodipine      Other reaction(s): Dizziness    Ammonium Hydroxide     Amoxicillin     Avandia [Rosiglitazone]     Benzalkonium Chloride     Bifidobacterium     Brimonidine Tartrate     Cephalexin     Codeine     Diclofenac     Dorzolamide     Etodolac     Feldene [Piroxicam]     Lactobacillus     Levaquin [Levofloxacin In D5w]      Sweating, irritable.  Lexapro [Escitalopram]     Metoprolol     Metronidazole     Olmesartan      Other reaction(s): Dizziness    Pantoprazole     Penicillins     Pilocarpine Hydrochloride [Pilocarpine]      Opthalmic solution. Gives headache      Prednisone     Propoxyphene      Darvocet    Qvar [Beclomethasone] Swelling    Streptococci     Travoprost     Vasotec [Enalapril Maleate]     Vicodin [Hydrocodone-Acetaminophen]     Voltaren [Diclofenac Sodium]        Health Maintenance   Topic Date Due    DTaP/Tdap/Td vaccine (1 - Tdap) 09/20/1954    Shingles Vaccine (1 of 2) 09/20/1986    DEXA (modify frequency per FRAX score)  09/20/1991    Annual Wellness Visit (AWV)  05/29/2019    Lipid screen  09/01/2021    Potassium monitoring  09/01/2021    Creatinine monitoring  09/01/2021    Flu vaccine  Completed    Pneumococcal 65+ years Vaccine  Completed    Hepatitis A vaccine  Aged Out    Hib vaccine  Aged Out    Meningococcal (ACWY) vaccine  Aged Out       Subjective:      Review of Systems    Objective:     /68 (Site: Left Upper Arm, Position: Sitting, Cuff Size: Large Adult)   Pulse 79   Wt 212 lb (96.2 kg)   SpO2 100%   BMI 32.69 kg/m²     Physical Exam  Vitals signs and nursing note reviewed. Constitutional:       Appearance: Normal appearance. She is well-developed. HENT:      Head: Normocephalic.       Right Ear: External ear normal.      Left Ear: External ear normal.      Nose: Nose normal.      Mouth/Throat:      Pharynx: No oropharyngeal exudate or posterior oropharyngeal erythema. Eyes:      Conjunctiva/sclera: Conjunctivae normal.      Pupils: Pupils are equal, round, and reactive to light. Neck:      Musculoskeletal: Normal range of motion and neck supple. Thyroid: No thyromegaly. Comments: Bilateral decreased carotid upstrokes  Cardiovascular:      Rate and Rhythm: Normal rate. Pulses: Normal pulses. Heart sounds: Murmur present. Comments: Irregular irregular rhythm with murmur at the LSB, bradycardic  Pulmonary:      Effort: Pulmonary effort is normal. No respiratory distress. Breath sounds: Normal breath sounds. No rales. Abdominal:      General: There is no distension. Palpations: Abdomen is soft. Tenderness: There is no abdominal tenderness. Musculoskeletal:      Right lower leg: Edema (Trace bilateral lower extremities) present. Left lower leg: Edema present. Legs:    Skin:     Capillary Refill: Capillary refill takes less than 2 seconds. Findings: No erythema. Neurological:      General: No focal deficit present. Mental Status: She is alert and oriented to person, place, and time. Cranial Nerves: No cranial nerve deficit. Psychiatric:         Mood and Affect: Mood normal.         Behavior: Behavior normal.         Thought Content: Thought content normal.         Judgment: Judgment normal.         Assessment/Plan:      Diagnosis Orders   1. Type 2 diabetes mellitus with stage 1 chronic kidney disease, with long-term current use of insulin ([facility])  POCT glycosylated hemoglobin (Hb A1C)   2. SSS (sick sinus syndrome) ([address]     3. PVD (peripheral vascular disease) ([address]     4. 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 ([address]     5. Essential hypertension     6.  Chronic atrial fibrillation       Her A1c is well controlled with her current regimen of the basal insulin with a sliding scale which has been tested for her over the last decade to be the Return in about 3 months (around 12/10/2020) for DM follow up, HTN. Patient given educational materials - see patientinstructions. Discussed use, benefit, and side effects of prescribed medications. All patient questions answered. Pt voiced understanding. Reviewed health maintenance. Instructed to continue current medications, diet andexercise. Patient agreed with treatment plan. Follow up as directed.      (Please note that portions of this note were completed with a voice-recognition program. Efforts were made to edit the dictation but occasionally words are mis-transcribed.)    Electronically signed by [place] Nur MD on 9/13/2020

## 2020-10-20 ENCOUNTER — TELEPHONE (OUTPATIENT)
Dept: FAMILY MEDICINE CLINIC | Age: 85
End: 2020-10-20

## 2020-10-20 NOTE — TELEPHONE ENCOUNTER
Patient called stating her blood sugars have been running low. Yesterday she did not get up until noon and her sugar was 80, 6pm it was 131, 8pm was 176. She took the [place] 35 units last night and novolog 2 units. She did not take novolog 10 units with her largest meal yesterday. She has not taken the novolog 10 units since Saturday. This morning her fasting blood sugar at 8am was 52. SHe ate a breakfast bar and rechecked it was 45. She then ate a banana and it was 53. She is feeling ok just a little weak. She is going to eat some peanut butter crackers and will continue to monitor her sugar until she hears back from Dr Tierra Lerma.

## 2020-10-26 ENCOUNTER — TELEPHONE (OUTPATIENT)
Dept: FAMILY MEDICINE CLINIC | Age: 85
End: 2020-10-26

## 2020-10-26 NOTE — TELEPHONE ENCOUNTER
Okay to switch the home health company. Please go ahead and put that new referral in. It is not uncommon to have a little bit of variation between the Ward and the fingersticks. The fingersticks are slower to react to changes than the Ward is. [address] is more sensitive to rapid up-and-down in the blood sugar than the fingersticks they may be 10-20 points off but should not be 100 points off from the fingerstick. If she continues to have issues with dosing is not working that she certainly should call the company. It is fine to switch that 10 units with the largest meal the day to which ever is a larger unit. During the week if it is Meals on Wheels have it with the Meals on Wheels. If it is on the weekend and is in the evening with her family then have it in the evening meal with her family on those days.

## 2020-11-03 PROBLEM — I73.9 PVD (PERIPHERAL VASCULAR DISEASE) (HCC): Status: RESOLVED | Noted: 2019-08-22 | Resolved: 2020-11-03

## 2020-11-25 ENCOUNTER — VIRTUAL VISIT (OUTPATIENT)
Dept: FAMILY MEDICINE CLINIC | Age: 85
End: 2020-11-25
Payer: MEDICARE

## 2020-11-25 ENCOUNTER — TELEPHONE (OUTPATIENT)
Dept: FAMILY MEDICINE CLINIC | Age: 85
End: 2020-11-25

## 2020-11-25 ENCOUNTER — HOSPITAL ENCOUNTER (OUTPATIENT)
Age: 85
Setting detail: SPECIMEN
Discharge: HOME | End: 2020-11-25
Payer: MEDICARE

## 2020-11-25 LAB
-: ABNORMAL
AMORPHOUS: ABNORMAL
BACTERIA: ABNORMAL
BILIRUBIN URINE: NEGATIVE
CASTS UA: ABNORMAL /LPF (ref 0–2)
COLOR: ABNORMAL
COMMENT UA: ABNORMAL
CRYSTALS, UA: ABNORMAL /HPF
EPITHELIAL CELLS UA: ABNORMAL /HPF (ref 0–5)
GLUCOSE URINE: NEGATIVE
KETONES, URINE: NEGATIVE
LEUKOCYTE ESTERASE, URINE: ABNORMAL
MUCUS: ABNORMAL
NITRITE, URINE: NEGATIVE
OTHER OBSERVATIONS UA: ABNORMAL
PH UA: 6 (ref 5–6)
PROTEIN UA: NEGATIVE
RBC UA: ABNORMAL /HPF (ref 0–4)
RENAL EPITHELIAL, UA: ABNORMAL /HPF
SPECIFIC GRAVITY UA: 1.02 (ref 1.01–1.02)
TRICHOMONAS: ABNORMAL
TURBIDITY: ABNORMAL
URINE HGB: NEGATIVE
UROBILINOGEN, URINE: NORMAL
WBC UA: ABNORMAL /HPF (ref 0–4)
YEAST: ABNORMAL

## 2020-11-25 PROCEDURE — 99212 OFFICE O/P EST SF 10 MIN: CPT

## 2020-11-25 PROCEDURE — 87086 URINE CULTURE/COLONY COUNT: CPT

## 2020-11-25 PROCEDURE — 99442 PR PHYS/QHP TELEPHONE EVALUATION 11-20 MIN: CPT | Performed by: FAMILY MEDICINE

## 2020-11-25 PROCEDURE — 81001 URINALYSIS AUTO W/SCOPE: CPT

## 2020-11-25 RX ORDER — CEFDINIR 300 MG/1
300 CAPSULE ORAL 2 TIMES DAILY
Qty: 14 CAPSULE | Refills: 0 | Status: SHIPPED | OUTPATIENT
Start: 2020-11-25 | End: 2020-12-02

## 2020-11-25 ASSESSMENT — ENCOUNTER SYMPTOMS
NAUSEA: 0
VOMITING: 0

## 2020-11-25 NOTE — TELEPHONE ENCOUNTER
Patient called stating she has been feeling really weak and tired. She was going to call earlier in the week but decided not to. This morning she woke up with a fever of 102. She denies any SOB, chest pain, Congestion, sore throat, Nausea, vomiting, diarrhea, or headache. She does have a cough but states she always has a cough and it has not changed. She also has body aches but states she always hurts all over and it has not changed either. Patient is unable to do a video visit but did agree to a telephone visit. Appointment scheduled. She is going to have her Son in law  a urine cup so she can give a urine sample. UA ordered.

## 2020-11-25 NOTE — PROGRESS NOTES
April Morton is a 81 y.o. female evaluated via telephone on 11/25/2020. Consent:  She and/or health care decision maker is aware that that she may receive a bill for this telephone service, depending on her insurance coverage, and has provided verbal consent to proceed: Yes    Documentation:  I communicated with the patient and/or health care decision maker about UTI symptoms. Details of this discussion including any medical advice provided: UTI    Has not been feeling good for quite awhile. Did an at home test and this was clear about a week ago. Her nurse came today and is here now. Checked her temp this morning and it was 102. Her temp later in the morning was 98.9  Therapist took her temp and the Ox was 96  124/68  HR was 68. Son in law brought the UA up to the office earlier-- no result. Daughter has noticed she has had more symptoms - not herself, not finishing her sentences about 1 week ago. Fever    This is a new problem. Pertinent negatives include no nausea or vomiting. Urinary Tract Infection    This is a recurrent problem. The current episode started in the past 7 days (about 1 week ago). The problem has been rapidly worsening (really strong odor off and on recently). The quality of the pain is described as aching (lower belly). The pain is mild. The maximum temperature recorded prior to her arrival was 102 - 102.9 F. She is not sexually active. There is no history of pyelonephritis. Associated symptoms include chills, frequency (dribbling more) and urgency. Pertinent negatives include no discharge, flank pain, hematuria, hesitancy, nausea, sweats or vomiting. Associated symptoms comments: Decreased appetite but not any change from her usual. . She has tried nothing for the symptoms. Her past medical history is significant for recurrent UTIs. Diagnosis Orders   1.  Acute cystitis without hematuria  cefdinir (OMNICEF) 300 MG capsule     Will start on the antibiotics and push the fluids -- monitor the blood sugars carefully and will send for a culture. I affirm this is a Patient Initiated Episode with a Patient who has not had a related appointment within my department in the past 7 days or scheduled within the next 24 hours.     Patient identification was verified at the start of the visit: Yes    Total Time: minutes: 11-20 minutes    Note: not billable if this call serves to triage the patient into an appointment for the relevant concern      Raphael Peterson

## 2020-11-27 LAB
CULTURE: NORMAL
Lab: NORMAL
SPECIMEN DESCRIPTION: NORMAL

## 2020-12-03 ENCOUNTER — OFFICE VISIT (OUTPATIENT)
Dept: FAMILY MEDICINE CLINIC | Age: 85
End: 2020-12-03
Payer: MEDICARE

## 2020-12-03 VITALS
WEIGHT: 205 LBS | HEART RATE: 78 BPM | BODY MASS INDEX: 31.61 KG/M2 | OXYGEN SATURATION: 100 % | DIASTOLIC BLOOD PRESSURE: 70 MMHG | SYSTOLIC BLOOD PRESSURE: 130 MMHG

## 2020-12-03 LAB — HBA1C MFR BLD: 7.5 %

## 2020-12-03 PROCEDURE — G8427 DOCREV CUR MEDS BY ELIG CLIN: HCPCS | Performed by: FAMILY MEDICINE

## 2020-12-03 PROCEDURE — 1090F PRES/ABSN URINE INCON ASSESS: CPT | Performed by: FAMILY MEDICINE

## 2020-12-03 PROCEDURE — 99214 OFFICE O/P EST MOD 30 MIN: CPT | Performed by: FAMILY MEDICINE

## 2020-12-03 PROCEDURE — 1036F TOBACCO NON-USER: CPT | Performed by: FAMILY MEDICINE

## 2020-12-03 PROCEDURE — G8417 CALC BMI ABV UP PARAM F/U: HCPCS | Performed by: FAMILY MEDICINE

## 2020-12-03 PROCEDURE — G8400 PT W/DXA NO RESULTS DOC: HCPCS | Performed by: FAMILY MEDICINE

## 2020-12-03 PROCEDURE — G8484 FLU IMMUNIZE NO ADMIN: HCPCS | Performed by: FAMILY MEDICINE

## 2020-12-03 PROCEDURE — 99213 OFFICE O/P EST LOW 20 MIN: CPT | Performed by: FAMILY MEDICINE

## 2020-12-03 PROCEDURE — 4040F PNEUMOC VAC/ADMIN/RCVD: CPT | Performed by: FAMILY MEDICINE

## 2020-12-03 PROCEDURE — 1123F ACP DISCUSS/DSCN MKR DOCD: CPT | Performed by: FAMILY MEDICINE

## 2020-12-03 PROCEDURE — 3051F HG A1C>EQUAL 7.0%<8.0%: CPT | Performed by: FAMILY MEDICINE

## 2020-12-03 PROCEDURE — 83036 HEMOGLOBIN GLYCOSYLATED A1C: CPT | Performed by: FAMILY MEDICINE

## 2020-12-03 RX ORDER — DULOXETIN HYDROCHLORIDE 30 MG/1
30 CAPSULE, DELAYED RELEASE ORAL DAILY
Qty: 30 CAPSULE | Refills: 5 | Status: SHIPPED | OUTPATIENT
Start: 2020-12-03 | End: 2021-01-28 | Stop reason: SDUPTHER

## 2020-12-03 RX ORDER — PEN NEEDLE, DIABETIC 31 GX5/16"
1 NEEDLE, DISPOSABLE MISCELLANEOUS
Qty: 500 EACH | Refills: 3 | Status: SHIPPED | OUTPATIENT
Start: 2020-12-03 | End: 2021-02-10

## 2020-12-03 ASSESSMENT — PATIENT HEALTH QUESTIONNAIRE - PHQ9
8. MOVING OR SPEAKING SO SLOWLY THAT OTHER PEOPLE COULD HAVE NOTICED. OR THE OPPOSITE, BEING SO FIGETY OR RESTLESS THAT YOU HAVE BEEN MOVING AROUND A LOT MORE THAN USUAL: 2
SUM OF ALL RESPONSES TO PHQ QUESTIONS 1-9: 21
5. POOR APPETITE OR OVEREATING: 3
SUM OF ALL RESPONSES TO PHQ QUESTIONS 1-9: 21
3. TROUBLE FALLING OR STAYING ASLEEP: 3
6. FEELING BAD ABOUT YOURSELF - OR THAT YOU ARE A FAILURE OR HAVE LET YOURSELF OR YOUR FAMILY DOWN: 3
2. FEELING DOWN, DEPRESSED OR HOPELESS: 2
1. LITTLE INTEREST OR PLEASURE IN DOING THINGS: 3
SUM OF ALL RESPONSES TO PHQ QUESTIONS 1-9: 21
9. THOUGHTS THAT YOU WOULD BE BETTER OFF DEAD, OR OF HURTING YOURSELF: 0
4. FEELING TIRED OR HAVING LITTLE ENERGY: 3
7. TROUBLE CONCENTRATING ON THINGS, SUCH AS READING THE NEWSPAPER OR WATCHING TELEVISION: 2
10. IF YOU CHECKED OFF ANY PROBLEMS, HOW DIFFICULT HAVE THESE PROBLEMS MADE IT FOR YOU TO DO YOUR WORK, TAKE CARE OF THINGS AT HOME, OR GET ALONG WITH OTHER PEOPLE: 0
SUM OF ALL RESPONSES TO PHQ9 QUESTIONS 1 & 2: 5

## 2020-12-03 ASSESSMENT — COLUMBIA-SUICIDE SEVERITY RATING SCALE - C-SSRS
6. HAVE YOU EVER DONE ANYTHING, STARTED TO DO ANYTHING, OR PREPARED TO DO ANYTHING TO END YOUR LIFE?: NO
1. WITHIN THE PAST MONTH, HAVE YOU WISHED YOU WERE DEAD OR WISHED YOU COULD GO TO SLEEP AND NOT WAKE UP?: NO
2. HAVE YOU ACTUALLY HAD ANY THOUGHTS OF KILLING YOURSELF?: NO

## 2020-12-03 NOTE — PROGRESS NOTES
[facility]  [address]  Dept: [phone]  Dept IFM:[phone]    Meño Morgan is a 81 y.o. female who presents today for her medical conditions/complaints as notedbelow. Meño Morgan is c/o of Diabetes (3 month f/u); Hypertension (3 month f/u); and Other (no energy, lower back pain, hips hurt)      HPI:     HPI  Meño Morgan is a 81 y.o. female who presents for follow-up of hypertension, diabetes, and hyperlipidemia. She indicates that she is feeling well and denies any symptoms referable to her elevated blood pressure or diabetes. Specifically denies chest pain, palpitations, dyspnea, peripheral edema, thirst, frequent urination, and blurred vision. Current medication regimen is as listed below. She denies any side effects of medication, and has been taking it regularly. Home glucose readings have been still up and down--  but usually in the mid 100-mid 200 range. Checks blood sugars 2-3 times per day. Diabetic complications includeretinopathy, nephropathy, neuropathy and CAD. shehas been exercising regularly trying to move in the house as much as she can but has not even been doing the bicycle in her chair as she did previously. Hasbeen following a diabeticdiet. Has had lower back pain for several months. Also into her hips bilaterally. HAs been doing some therapy. Not even pedaling her bike at all now. It is getting a bit better. Has bothered her in the morning for a long time but was worse for the last few weeks. Just not feeling well \"all over\" has pain off and on in her legs and back. More fatigue, tired all the time. Has to rest as she goes room to room. Admits she feels housebound and down. The COVID-19 crisis and her health problems has started to wear on her. She is not tearful but is feeling sad.     BP Readings from Last 3 Encounters:   12/03/20 130/70   09/10/20 128/68   06/03/20 134/66          (goal 120/80)    Wt Readings from Last 3 Encounters:   12/03/20 205 lb (93 kg)   09/10/20 212 lb (96.2 kg)   06/03/20 212 lb (96.2 kg)        Past Medical History:   Diagnosis Date    Anxiety     Carotid stenosis, bilateral     Cerebrovascular disease     Chronic kidney disease, stage 3     Emphysema [facility] lung ([facility]     Glaucoma     Hyperlipidemia     Hypertension     Pacemaker, artificial     Peripheral vascular disease of lower extremity ([facility] 12/17/2018    Type II diabetes mellitus, uncontrolled ([facility] [facility]       Past Surgical History:   Procedure Laterality Date    CHOLECYSTECTOMY      COLONOSCOPY      EYE SURGERY      HYSTERECTOMY      PACEMAKER INSERTION         Family History   Problem Relation Age of Onset    Diabetes Mother     Heart Disease Father     Lung Cancer Sister        Social History     Tobacco Use    Smoking status: Never Smoker    Smokeless tobacco: Never Used   Substance Use Topics    Alcohol use: No      Current Outpatient Medications   Medication Sig Dispense Refill    Insulin Pen Needle (B-D UF III MINI PEN NEEDLES) 31G X 5 MM MISC Inject 1 each into the skin 4 times daily (before meals and nightly) 500 each 3    DULoxetine (CYMBALTA) 30 MG extended release capsule Take 1 capsule by mouth daily In addition to the 60 mg dose 30 capsule 5    clopidogrel (PLAVIX) 75 MG tablet Take 1 tablet by mouth daily 90 tablet 1    furosemide (LASIX) 40 MG tablet take 1 tablet by mouth once daily 30 tablet 5    Continuous Blood Gluc  (FREESTYLE KAHLIL 2 READER SYSTM) DANIEL 1 each by Does not apply route 4 times daily Test blood sugar 4 times daily 2 Device 12    Continuous Blood Gluc Sensor (FREESTYLE KAHLIL 14 DAY SENSOR) MISC 1 each by Does not apply route daily Tests blood sugar 4 times and as needed for fluctuating blood sugars 2 each 12    DULoxetine (CYMBALTA) 60 MG extended release capsule Take 1 capsule by mouth daily 90 capsule 3    insulin aspart (NOVOLOG FLEXPEN) 100 UNIT/ML injection pen 10 units before biggest meal and also sliding scale 16 pen 3    Elastic Bandages & Supports (WRIST SPLINT) MISC 2 each by Does not apply route nightly BILATERALLY 2 each 0    vitamin B-6 (PYRIDOXINE) 100 MG tablet Take 100 mg by mouth daily      nebivolol (BYSTOLIC) 5 MG tablet Take 5 mg by mouth      atorvastatin (LIPITOR) 40 MG tablet TAKE 1 TABLET ONCE A DAY 30 tablet 5    acetaminophen (TYLENOL) 500 MG tablet Take 500 mg by mouth every 6 hours as needed for Pain      folic acid (FOLVITE) 1 MG tablet Take 1 mg by mouth daily      Insulin Degludec (TRESIBA FLEXTOUCH) 100 UNIT/ML SOPN Inject 35 Units into the skin nightly (Patient taking differently: Inject 25 Units into the skin nightly ) 15 pen 3    latanoprost (XALATAN) 0.005 % ophthalmic solution 1 drop nightly      Handicap Placard MISC by Does not apply route The disability is expected to last lifetime  Dx: knee pain 1 each 0    apixaban (ELIQUIS) 2.5 MG TABS tablet Take 1 tablet by mouth 2 times daily 180 tablet 3    vitamin B-12 (CYANOCOBALAMIN) 1000 MCG tablet Take 1,000 mcg by mouth daily      psyllium (KONSYL) 28.3 % PACK Take 1 packet by mouth daily      melatonin 3 MG TABS tablet Take 3 mg by mouth nightly as needed       benzonatate (TESSALON) 100 MG capsule benzonatate 100 mg capsule   Take 1 capsule 3 times a day by oral route as needed. No current facility-administered medications for this visit.       Allergies   Allergen Reactions    Cilostazol      Chest quivers and increased blood sugars  Elevated blood sugar    Ace Inhibitors      HYPERKALEMIA (even low dose)    Amitriptyline     Amlodipine      Other reaction(s): Dizziness    Ammonium Hydroxide     Amoxicillin     Avandia [Rosiglitazone]     Benzalkonium Chloride     Bifidobacterium     Brimonidine Tartrate     Codeine     Diclofenac     Dorzolamide     Etodolac     Feldene [Piroxicam]     Lactobacillus     Levaquin [Levofloxacin In D5w] Sweating, irritable.  Lexapro [Escitalopram]     Metoprolol     Metronidazole     Olmesartan      Other reaction(s): Dizziness    Pantoprazole     Penicillins     Pilocarpine Hydrochloride [Pilocarpine]      Opthalmic solution. Gives headache      Prednisone     Propoxyphene      Darvocet    Qvar [Beclomethasone] Swelling    Streptococci     Travoprost     Vasotec [Enalapril Maleate]     Vicodin [Hydrocodone-Acetaminophen]     Voltaren [Diclofenac Sodium]        Health Maintenance   Topic Date Due    DTaP/Tdap/Td vaccine (1 - Tdap) 09/20/1955    Shingles Vaccine (1 of 2) 09/20/1986    DEXA (modify frequency per FRAX score)  09/20/1991    Annual Wellness Visit (AWV)  05/29/2019    Lipid screen  09/01/2021    Potassium monitoring  09/01/2021    Creatinine monitoring  09/01/2021    Flu vaccine  Completed    Pneumococcal 65+ years Vaccine  Completed    Hepatitis A vaccine  Aged Out    Hib vaccine  Aged Out    Meningococcal (ACWY) vaccine  Aged Out       Subjective:      Review of Systems    Objective:     /70 (Site: Left Upper Arm, Position: Sitting, Cuff Size: Large Adult)   Pulse 78   Wt 205 lb (93 kg)   SpO2 100%   BMI 31.61 kg/m²     Physical Exam  Vitals signs and nursing note reviewed. Constitutional:       Appearance: Normal appearance. She is well-developed. HENT:      Head: Normocephalic. Right Ear: External ear normal.      Left Ear: External ear normal.      Nose: Nose normal.      Mouth/Throat:      Pharynx: No oropharyngeal exudate or posterior oropharyngeal erythema. Eyes:      Conjunctiva/sclera: Conjunctivae normal.      Pupils: Pupils are equal, round, and reactive to light. Neck:      Musculoskeletal: Normal range of motion and neck supple. Thyroid: No thyromegaly. Comments: Bilateral decreased carotid upstrokes  Cardiovascular:      Rate and Rhythm: Normal rate. Pulses: Normal pulses. Heart sounds: Murmur present.       Comments: 09/01/2020    HCT 37.1 09/01/2020     09/01/2020    CHOL 76 09/01/2020    TRIG 83 09/01/2020    HDL 43 09/01/2020    ALT 17 09/01/2020    AST 21 09/01/2020     09/01/2020     09/01/2020    K 4.6 09/01/2020    K 4.6 09/01/2020     09/01/2020     09/01/2020    CREATININE 1.64 (H) 09/01/2020    CREATININE 1.64 (H) 09/01/2020    BUN 42 (H) 09/01/2020    BUN 42 (H) 09/01/2020    CO2 25 09/01/2020    CO2 25 09/01/2020    TSH 0.775 03/02/2018    INR 1.32 (H) 02/06/2020    LABA1C 7.5 12/03/2020    LABA1C 6.7 09/10/2020    LABA1C 6.9 06/03/2020       Return in about 3 months (around 3/3/2021) for Medication recheck, DM follow up. Patient given educational materials - see patientinstructions. Discussed use, benefit, and side effects of prescribed medications. All patient questions answered. Pt voiced understanding. Reviewed health maintenance. Instructed to continue current medications, diet andexercise. Patient agreed with treatment plan. Follow up as directed.      (Please note that portions of this note were completed with a voice-recognition program. Efforts were made to edit the dictation but occasionally words are mis-transcribed.)    Electronically signed by Oliva Mackenzie MD on 12/6/2020

## 2021-01-01 ENCOUNTER — OFFICE VISIT (OUTPATIENT)
Dept: FAMILY MEDICINE CLINIC | Age: 86
End: 2021-01-01
Payer: MEDICARE

## 2021-01-01 ENCOUNTER — TELEPHONE (OUTPATIENT)
Dept: FAMILY MEDICINE CLINIC | Age: 86
End: 2021-01-01

## 2021-01-01 ENCOUNTER — VIRTUAL VISIT (OUTPATIENT)
Dept: FAMILY MEDICINE CLINIC | Age: 86
End: 2021-01-01
Payer: MEDICARE

## 2021-01-01 VITALS
DIASTOLIC BLOOD PRESSURE: 68 MMHG | HEART RATE: 97 BPM | SYSTOLIC BLOOD PRESSURE: 112 MMHG | BODY MASS INDEX: 31.94 KG/M2 | WEIGHT: 207 LBS | OXYGEN SATURATION: 98 %

## 2021-01-01 VITALS
SYSTOLIC BLOOD PRESSURE: 128 MMHG | WEIGHT: 210 LBS | DIASTOLIC BLOOD PRESSURE: 68 MMHG | OXYGEN SATURATION: 98 % | BODY MASS INDEX: 32.41 KG/M2 | HEART RATE: 83 BPM

## 2021-01-01 VITALS
WEIGHT: 211.2 LBS | BODY MASS INDEX: 32.01 KG/M2 | OXYGEN SATURATION: 98 % | HEIGHT: 68 IN | SYSTOLIC BLOOD PRESSURE: 110 MMHG | DIASTOLIC BLOOD PRESSURE: 78 MMHG | HEART RATE: 97 BPM

## 2021-01-01 DIAGNOSIS — E11.22 TYPE 2 DIABETES MELLITUS WITH STAGE 1 CHRONIC KIDNEY DISEASE, WITH LONG-TERM CURRENT USE OF INSULIN (HCC): Primary | ICD-10-CM

## 2021-01-01 DIAGNOSIS — Z79.4 TYPE 2 DIABETES MELLITUS WITH STAGE 1 CHRONIC KIDNEY DISEASE, WITH LONG-TERM CURRENT USE OF INSULIN (HCC): Primary | ICD-10-CM

## 2021-01-01 DIAGNOSIS — R93.89 ABNORMAL CHEST X-RAY: ICD-10-CM

## 2021-01-01 DIAGNOSIS — E11.21 TYPE 2 DIABETES MELLITUS WITH DIABETIC NEPHROPATHY, WITH LONG-TERM CURRENT USE OF INSULIN (HCC): ICD-10-CM

## 2021-01-01 DIAGNOSIS — R25.2 LEG CRAMPS: ICD-10-CM

## 2021-01-01 DIAGNOSIS — D50.9 IRON DEFICIENCY ANEMIA, UNSPECIFIED IRON DEFICIENCY ANEMIA TYPE: ICD-10-CM

## 2021-01-01 DIAGNOSIS — Z78.0 MENOPAUSE: ICD-10-CM

## 2021-01-01 DIAGNOSIS — C34.12 MALIGNANT NEOPLASM OF UPPER LOBE OF LEFT LUNG (HCC): Primary | ICD-10-CM

## 2021-01-01 DIAGNOSIS — Z79.4 TYPE 2 DIABETES MELLITUS WITH STAGE 1 CHRONIC KIDNEY DISEASE, WITH LONG-TERM CURRENT USE OF INSULIN (HCC): ICD-10-CM

## 2021-01-01 DIAGNOSIS — E11.65 UNCONTROLLED TYPE 2 DIABETES MELLITUS WITH HYPERGLYCEMIA (HCC): ICD-10-CM

## 2021-01-01 DIAGNOSIS — I10 ESSENTIAL HYPERTENSION: ICD-10-CM

## 2021-01-01 DIAGNOSIS — I48.20 CHRONIC ATRIAL FIBRILLATION (HCC): ICD-10-CM

## 2021-01-01 DIAGNOSIS — E11.21 DIABETIC GLOMERULOPATHY (HCC): ICD-10-CM

## 2021-01-01 DIAGNOSIS — D63.1 ANEMIA DUE TO STAGE 4 CHRONIC KIDNEY DISEASE (HCC): ICD-10-CM

## 2021-01-01 DIAGNOSIS — Z23 NEED FOR INFLUENZA VACCINATION: ICD-10-CM

## 2021-01-01 DIAGNOSIS — N18.4 STAGE 4 CHRONIC KIDNEY DISEASE (HCC): ICD-10-CM

## 2021-01-01 DIAGNOSIS — R25.2 MUSCLE CRAMPS AT NIGHT: ICD-10-CM

## 2021-01-01 DIAGNOSIS — N18.4 ANEMIA DUE TO STAGE 4 CHRONIC KIDNEY DISEASE (HCC): ICD-10-CM

## 2021-01-01 DIAGNOSIS — R05.9 COUGH: Primary | ICD-10-CM

## 2021-01-01 DIAGNOSIS — N18.1 TYPE 2 DIABETES MELLITUS WITH STAGE 1 CHRONIC KIDNEY DISEASE, WITH LONG-TERM CURRENT USE OF INSULIN (HCC): Primary | ICD-10-CM

## 2021-01-01 DIAGNOSIS — Z23 NEED FOR PROPHYLACTIC VACCINATION AGAINST STREPTOCOCCUS PNEUMONIAE (PNEUMOCOCCUS): ICD-10-CM

## 2021-01-01 DIAGNOSIS — Z79.4 TYPE 2 DIABETES MELLITUS WITH DIABETIC NEPHROPATHY, WITH LONG-TERM CURRENT USE OF INSULIN (HCC): ICD-10-CM

## 2021-01-01 DIAGNOSIS — E11.22 TYPE 2 DIABETES MELLITUS WITH STAGE 1 CHRONIC KIDNEY DISEASE, WITH LONG-TERM CURRENT USE OF INSULIN (HCC): ICD-10-CM

## 2021-01-01 DIAGNOSIS — H81.10 BENIGN PAROXYSMAL POSITIONAL VERTIGO, UNSPECIFIED LATERALITY: ICD-10-CM

## 2021-01-01 DIAGNOSIS — I73.9 PERIPHERAL VASCULAR DISEASE OF LOWER EXTREMITY (HCC): Primary | ICD-10-CM

## 2021-01-01 DIAGNOSIS — N18.1 TYPE 2 DIABETES MELLITUS WITH STAGE 1 CHRONIC KIDNEY DISEASE, WITH LONG-TERM CURRENT USE OF INSULIN (HCC): ICD-10-CM

## 2021-01-01 LAB
ALBUMIN/GLOBULIN RATIO: 1.06 G/DL
ALBUMIN: 3.4 G/DL (ref 3.5–5)
ALP BLD-CCNC: 109 UNITS/L (ref 38–126)
ALT SERPL-CCNC: 28 UNITS/L (ref 4–35)
ANION GAP SERPL CALCULATED.3IONS-SCNC: 12.7 MMOL/L
AST SERPL-CCNC: 34 UNITS/L (ref 14–36)
BASOPHILS %: 0.83 (ref 0–3)
BASOPHILS ABSOLUTE: 0.06 (ref 0–0.3)
BILIRUB SERPL-MCNC: 0.7 MG/DL (ref 0.2–1.3)
BUN BLDV-MCNC: 46 MG/DL (ref 7–17)
CALCIUM SERPL-MCNC: 8.8 MG/DL (ref 8.4–10.2)
CHLORIDE BLD-SCNC: 105 MMOL/L (ref 98–120)
CHOLESTEROL/HDL RATIO: 2.27 RATIO (ref 0–4.5)
CHOLESTEROL: 93 MG/DL (ref 50–200)
CO2: 24 MMOL/L (ref 22–31)
CREAT SERPL-MCNC: 1.6 MG/DL (ref 0.5–1)
EOSINOPHILS %: 4.78 (ref 0–10)
EOSINOPHILS ABSOLUTE: 0.34 (ref 0–1.1)
GFR CALCULATED: 32.6
GLOBULIN: 3.2 G/DL
GLUCOSE: 163 MG/DL (ref 65–105)
HBA1C MFR BLD: 6.8 %
HBA1C MFR BLD: 7.3 %
HCT VFR BLD CALC: 35.3 % (ref 37–47)
HDLC SERPL-MCNC: 41 MG/DL (ref 36–68)
HEMOGLOBIN: 11.8 (ref 12–16)
IRON SATURATION: 19 % (ref 15–38)
IRON: 69 MG/DL (ref 37–170)
LDL CHOLESTEROL CALCULATED: 29.2 MG/DL (ref 0–160)
LYMPHOCYTE %: 15.15 (ref 20–51.1)
LYMPHOCYTES ABSOLUTE: 1.08 (ref 1–5.5)
Lab: NORMAL
MAGNESIUM: 2 MG/DL (ref 1.6–2.3)
MCH RBC QN AUTO: 31.5 PG (ref 28.5–32.5)
MCHC RBC AUTO-ENTMCNC: 33.4 G/DL (ref 32–37)
MCV RBC AUTO: 94.3 FL (ref 80–94)
MONOCYTES %: 11.24 (ref 1.7–9.3)
MONOCYTES ABSOLUTE: 0.8 (ref 0.1–1)
NEUTROPHILS %: 68 (ref 42.2–75.2)
NEUTROPHILS ABSOLUTE: 4.86 (ref 2–8.1)
PDW BLD-RTO: 12.5 % (ref 8.5–15.5)
PLATELET # BLD: 230.5 THOU/MM3 (ref 130–400)
POTASSIUM SERPL-SCNC: 4.7 MMOL/L (ref 3.6–5)
QC PASS/FAIL: NORMAL
RBC: 3.74 M/UL (ref 4.2–5.4)
SARS-COV-2 RDRP RESP QL NAA+PROBE: NEGATIVE
SODIUM BLD-SCNC: 137 MMOL/L (ref 135–145)
TOTAL IRON BINDING CAPACITY: 366 MG/DL (ref 261–497)
TOTAL PROTEIN, SERUM: 6.6 G/DL (ref 6.3–8.2)
TRIGL SERPL-MCNC: 114 MG/DL (ref 10–250)
VLDLC SERPL CALC-MCNC: 23 MG/DL (ref 0–50)
WBC: 7.1 THOU/ML3 (ref 4.8–10.8)

## 2021-01-01 PROCEDURE — 99213 OFFICE O/P EST LOW 20 MIN: CPT

## 2021-01-01 PROCEDURE — G8427 DOCREV CUR MEDS BY ELIG CLIN: HCPCS | Performed by: FAMILY MEDICINE

## 2021-01-01 PROCEDURE — 3051F HG A1C>EQUAL 7.0%<8.0%: CPT | Performed by: FAMILY MEDICINE

## 2021-01-01 PROCEDURE — 99211 OFF/OP EST MAY X REQ PHY/QHP: CPT

## 2021-01-01 PROCEDURE — G8484 FLU IMMUNIZE NO ADMIN: HCPCS | Performed by: FAMILY MEDICINE

## 2021-01-01 PROCEDURE — 83036 HEMOGLOBIN GLYCOSYLATED A1C: CPT | Performed by: FAMILY MEDICINE

## 2021-01-01 PROCEDURE — 4040F PNEUMOC VAC/ADMIN/RCVD: CPT | Performed by: FAMILY MEDICINE

## 2021-01-01 PROCEDURE — G8400 PT W/DXA NO RESULTS DOC: HCPCS | Performed by: FAMILY MEDICINE

## 2021-01-01 PROCEDURE — 1036F TOBACCO NON-USER: CPT | Performed by: FAMILY MEDICINE

## 2021-01-01 PROCEDURE — 99212 OFFICE O/P EST SF 10 MIN: CPT | Performed by: NURSE PRACTITIONER

## 2021-01-01 PROCEDURE — PBSHW POCT GLYCOSYLATED HEMOGLOBIN (HGB A1C): Performed by: FAMILY MEDICINE

## 2021-01-01 PROCEDURE — 1123F ACP DISCUSS/DSCN MKR DOCD: CPT | Performed by: FAMILY MEDICINE

## 2021-01-01 PROCEDURE — G8417 CALC BMI ABV UP PARAM F/U: HCPCS | Performed by: FAMILY MEDICINE

## 2021-01-01 PROCEDURE — 99211 OFF/OP EST MAY X REQ PHY/QHP: CPT | Performed by: FAMILY MEDICINE

## 2021-01-01 PROCEDURE — 1090F PRES/ABSN URINE INCON ASSESS: CPT | Performed by: FAMILY MEDICINE

## 2021-01-01 PROCEDURE — 99212 OFFICE O/P EST SF 10 MIN: CPT

## 2021-01-01 PROCEDURE — G2012 BRIEF CHECK IN BY MD/QHP: HCPCS | Performed by: NURSE PRACTITIONER

## 2021-01-01 PROCEDURE — 99214 OFFICE O/P EST MOD 30 MIN: CPT | Performed by: FAMILY MEDICINE

## 2021-01-01 PROCEDURE — 87635 SARS-COV-2 COVID-19 AMP PRB: CPT | Performed by: NURSE PRACTITIONER

## 2021-01-01 PROCEDURE — 90694 VACC AIIV4 NO PRSRV 0.5ML IM: CPT | Performed by: FAMILY MEDICINE

## 2021-01-01 PROCEDURE — PBSHW PNEUMOCOCCAL CONJUGATE VACCINE 13-VALENT IM: Performed by: FAMILY MEDICINE

## 2021-01-01 PROCEDURE — PBSHW INFLUENZA, QUADV, ADJUVANTED, 65 YRS +, IM, PF, PREFILL SYR, 0.5ML (FLUAD): Performed by: FAMILY MEDICINE

## 2021-01-01 PROCEDURE — G0009 ADMIN PNEUMOCOCCAL VACCINE: HCPCS | Performed by: FAMILY MEDICINE

## 2021-01-01 RX ORDER — DULOXETIN HYDROCHLORIDE 60 MG/1
CAPSULE, DELAYED RELEASE ORAL
Qty: 90 CAPSULE | Refills: 3 | Status: SHIPPED | OUTPATIENT
Start: 2021-01-01

## 2021-01-01 RX ORDER — ATORVASTATIN CALCIUM 40 MG/1
TABLET, FILM COATED ORAL
Qty: 90 TABLET | Refills: 1 | Status: SHIPPED | OUTPATIENT
Start: 2021-01-01 | End: 2022-01-01

## 2021-01-01 RX ORDER — BIMATOPROST 0.3 MG/ML
SOLUTION/ DROPS OPHTHALMIC
COMMUNITY
Start: 2021-01-01

## 2021-01-01 RX ORDER — CLOPIDOGREL BISULFATE 75 MG/1
TABLET ORAL
Qty: 90 TABLET | Refills: 1 | Status: SHIPPED | OUTPATIENT
Start: 2021-01-01 | End: 2022-01-01

## 2021-01-01 RX ORDER — NEBIVOLOL HYDROCHLORIDE 5 MG/1
TABLET ORAL
Qty: 90 TABLET | Refills: 1 | Status: SHIPPED | OUTPATIENT
Start: 2021-01-01

## 2021-01-01 RX ORDER — PEN NEEDLE, DIABETIC 31 GX5/16"
NEEDLE, DISPOSABLE MISCELLANEOUS
Qty: 100 EACH | Refills: 3 | Status: SHIPPED | OUTPATIENT
Start: 2021-01-01 | End: 2021-01-01 | Stop reason: SDUPTHER

## 2021-01-01 RX ORDER — FUROSEMIDE 40 MG/1
TABLET ORAL
Qty: 90 TABLET | Refills: 1 | Status: SHIPPED | OUTPATIENT
Start: 2021-01-01 | End: 2022-01-01

## 2021-01-01 RX ORDER — BACLOFEN 10 MG/1
TABLET ORAL
Qty: 30 TABLET | Refills: 1 | Status: SHIPPED | OUTPATIENT
Start: 2021-01-01

## 2021-01-01 RX ORDER — PEN NEEDLE, DIABETIC 31 GX5/16"
NEEDLE, DISPOSABLE MISCELLANEOUS
Qty: 100 EACH | Refills: 5 | Status: SHIPPED | OUTPATIENT
Start: 2021-01-01 | End: 2022-01-01 | Stop reason: SDUPTHER

## 2021-01-01 SDOH — ECONOMIC STABILITY: TRANSPORTATION INSECURITY
IN THE PAST 12 MONTHS, HAS LACK OF TRANSPORTATION KEPT YOU FROM MEETINGS, WORK, OR FROM GETTING THINGS NEEDED FOR DAILY LIVING?: NO

## 2021-01-01 SDOH — ECONOMIC STABILITY: TRANSPORTATION INSECURITY
IN THE PAST 12 MONTHS, HAS THE LACK OF TRANSPORTATION KEPT YOU FROM MEDICAL APPOINTMENTS OR FROM GETTING MEDICATIONS?: NO

## 2021-01-01 SDOH — ECONOMIC STABILITY: FOOD INSECURITY: WITHIN THE PAST 12 MONTHS, THE FOOD YOU BOUGHT JUST DIDN'T LAST AND YOU DIDN'T HAVE MONEY TO GET MORE.: NEVER TRUE

## 2021-01-01 SDOH — ECONOMIC STABILITY: FOOD INSECURITY: WITHIN THE PAST 12 MONTHS, YOU WORRIED THAT YOUR FOOD WOULD RUN OUT BEFORE YOU GOT MONEY TO BUY MORE.: NEVER TRUE

## 2021-01-01 ASSESSMENT — PATIENT HEALTH QUESTIONNAIRE - PHQ9
2. FEELING DOWN, DEPRESSED OR HOPELESS: 1
SUM OF ALL RESPONSES TO PHQ QUESTIONS 1-9: 1
SUM OF ALL RESPONSES TO PHQ QUESTIONS 1-9: 2
SUM OF ALL RESPONSES TO PHQ9 QUESTIONS 1 & 2: 2
SUM OF ALL RESPONSES TO PHQ QUESTIONS 1-9: 1
2. FEELING DOWN, DEPRESSED OR HOPELESS: 1
1. LITTLE INTEREST OR PLEASURE IN DOING THINGS: 1
SUM OF ALL RESPONSES TO PHQ9 QUESTIONS 1 & 2: 1
1. LITTLE INTEREST OR PLEASURE IN DOING THINGS: 0
SUM OF ALL RESPONSES TO PHQ QUESTIONS 1-9: 1

## 2021-01-01 ASSESSMENT — SOCIAL DETERMINANTS OF HEALTH (SDOH): HOW HARD IS IT FOR YOU TO PAY FOR THE VERY BASICS LIKE FOOD, HOUSING, MEDICAL CARE, AND HEATING?: NOT HARD AT ALL

## 2021-01-02 ENCOUNTER — PATIENT MESSAGE (OUTPATIENT)
Dept: FAMILY MEDICINE CLINIC | Age: 86
End: 2021-01-02

## 2021-01-02 DIAGNOSIS — Z79.4 TYPE 2 DIABETES MELLITUS WITH DIABETIC NEPHROPATHY, WITH LONG-TERM CURRENT USE OF INSULIN (HCC): ICD-10-CM

## 2021-01-02 DIAGNOSIS — E11.21 TYPE 2 DIABETES MELLITUS WITH DIABETIC NEPHROPATHY, WITH LONG-TERM CURRENT USE OF INSULIN (HCC): ICD-10-CM

## 2021-01-04 RX ORDER — CLOPIDOGREL BISULFATE 75 MG/1
75 TABLET ORAL DAILY
Qty: 90 TABLET | Refills: 1 | Status: SHIPPED | OUTPATIENT
Start: 2021-01-04 | End: 2021-01-01

## 2021-01-04 RX ORDER — FUROSEMIDE 40 MG/1
TABLET ORAL
Qty: 90 TABLET | Refills: 1 | Status: SHIPPED | OUTPATIENT
Start: 2021-01-04 | End: 2021-01-01

## 2021-01-04 RX ORDER — NEBIVOLOL 5 MG/1
5 TABLET ORAL DAILY
Qty: 90 TABLET | Refills: 1 | Status: SHIPPED | OUTPATIENT
Start: 2021-01-04 | End: 2021-01-01

## 2021-01-04 RX ORDER — ATORVASTATIN CALCIUM 40 MG/1
TABLET, FILM COATED ORAL
Qty: 90 TABLET | Refills: 1 | Status: SHIPPED | OUTPATIENT
Start: 2021-01-04 | End: 2021-01-01

## 2021-01-04 NOTE — TELEPHONE ENCOUNTER
Please verify with patient the refill request was for 15 units but at last visit she was only taking 25 units. To send in the prescription correctly verify she is taking 25,35 or 50 units daily.

## 2021-01-04 NOTE — TELEPHONE ENCOUNTER
Laura Gould is requesting a refill on the following medication(s):  Requested Prescriptions     Pending Prescriptions Disp Refills    furosemide (LASIX) 40 MG tablet 90 tablet 1     Sig: take 1 tablet by mouth once daily    clopidogrel (PLAVIX) 75 MG tablet 90 tablet 1     Sig: Take 1 tablet by mouth daily    nebivolol (BYSTOLIC) 5 MG tablet 90 tablet 1     Sig: Take 1 tablet by mouth daily    atorvastatin (LIPITOR) 40 MG tablet 90 tablet 1     Sig: TAKE 1 TABLET ONCE A DAY       Last Visit Date (If Applicable):  98/7/4049    Next Visit Date:    1/2/2021

## 2021-01-04 NOTE — TELEPHONE ENCOUNTER
From: Titus Michel  To: Dee Dee Doan MD  Sent: 1/2/2021 3:37 PM EST  Subject: Prescription Question    Dr Tonja Hester,  I am Roxanne's son and have assumed her medication management. Currently there are medications going thru [facility] that she would like to go to [facility] mail order. The medications that will need prescriptions forwarded to [facility] include:   Furosemide 40 mg daily  Clopidrogel 75 mg daily  Bystolic 5 mg - takes 1/2 tab daily  Atovastatin 40 mg daily    The Duloxetine 90 mg is working well for her. No side effects and improved emotions. No prescription is needed at this time. Thanks in advance for your assistance. I can be reached at [phone].      Maritza De La Fuente

## 2021-01-04 NOTE — TELEPHONE ENCOUNTER
Santiago Torres is requesting a refill on the following medication(s):  Requested Prescriptions     Pending Prescriptions Disp Refills    TRESIBA FLEXTOUCH 100 UNIT/ML SOPN [Pharmacy Med Name: TRESIBA FLEX PEN 100U/ML] 45 mL 3     Sig: INJECT 50 UNITS            SUBCUTANEOUSLY DAILY       Last Visit Date (If Applicable):  07/6/2501    Next Visit Date:    3/3/2021

## 2021-01-05 ENCOUNTER — TELEPHONE (OUTPATIENT)
Dept: FAMILY MEDICINE CLINIC | Age: 86
End: 2021-01-05

## 2021-01-05 NOTE — TELEPHONE ENCOUNTER
Patient has a lot of allergies and wants to make sure that Dr Simone Betancourt thinks that it is ok for her to get the COVID vaccine when available. Please advise .

## 2021-01-06 RX ORDER — INSULIN DEGLUDEC INJECTION 100 U/ML
25 INJECTION, SOLUTION SUBCUTANEOUS NIGHTLY
Qty: 45 ML | Refills: 3 | Status: SHIPPED | OUTPATIENT
Start: 2021-01-06 | End: 2022-01-01

## 2021-01-06 NOTE — TELEPHONE ENCOUNTER
Definitely would like her to get the vaccine. There will be a system in place where she gets the vaccine to manage any potential reactions.

## 2021-01-11 NOTE — TELEPHONE ENCOUNTER
Pt was informed and will put new referral in S[employer] Intro: Intraoperative tissue expansion was performed, utilizing the SUTUREGARD device, in order to reduce wound tension.

## 2021-01-20 ENCOUNTER — TELEPHONE (OUTPATIENT)
Dept: FAMILY MEDICINE CLINIC | Age: 86
End: 2021-01-20

## 2021-01-20 NOTE — TELEPHONE ENCOUNTER
Patient called stating she is having dizzy spells. Last Thursday had to use walker because it was so bad. She had to sit back down a couple times before she could get up to walk. The dizziness did not go away completely but did get better. This morning at 4 am when she got out of bed she had another dizzy spell. She did sit on the side of her bed for a little while before getting up to go to the bathroom. She had a hard time walking to the bathroom because she was so dizzy. She is still dizzy now just not as bad as this morning. She is also having some pain in her neck and shoulders. The only recent med change was duloxetine-In December she went from 60 mg nightly to 90 mg nightly. She wants to know if there is something that she can do to help with the dizziness.

## 2021-01-21 ENCOUNTER — OFFICE VISIT (OUTPATIENT)
Dept: FAMILY MEDICINE CLINIC | Age: 86
End: 2021-01-21
Payer: MEDICARE

## 2021-01-21 VITALS
HEART RATE: 89 BPM | WEIGHT: 208 LBS | DIASTOLIC BLOOD PRESSURE: 82 MMHG | OXYGEN SATURATION: 98 % | SYSTOLIC BLOOD PRESSURE: 122 MMHG | BODY MASS INDEX: 32.08 KG/M2

## 2021-01-21 DIAGNOSIS — I48.20 CHRONIC ATRIAL FIBRILLATION (HCC): ICD-10-CM

## 2021-01-21 DIAGNOSIS — M54.6 ACUTE BILATERAL THORACIC BACK PAIN: ICD-10-CM

## 2021-01-21 DIAGNOSIS — F41.9 ANXIETY: ICD-10-CM

## 2021-01-21 DIAGNOSIS — H81.13 BENIGN PAROXYSMAL POSITIONAL VERTIGO DUE TO BILATERAL VESTIBULAR DISORDER: Primary | ICD-10-CM

## 2021-01-21 PROCEDURE — 99213 OFFICE O/P EST LOW 20 MIN: CPT | Performed by: FAMILY MEDICINE

## 2021-01-21 PROCEDURE — G8484 FLU IMMUNIZE NO ADMIN: HCPCS | Performed by: FAMILY MEDICINE

## 2021-01-21 PROCEDURE — G8427 DOCREV CUR MEDS BY ELIG CLIN: HCPCS | Performed by: FAMILY MEDICINE

## 2021-01-21 PROCEDURE — 1036F TOBACCO NON-USER: CPT | Performed by: FAMILY MEDICINE

## 2021-01-21 PROCEDURE — 4040F PNEUMOC VAC/ADMIN/RCVD: CPT | Performed by: FAMILY MEDICINE

## 2021-01-21 PROCEDURE — 1123F ACP DISCUSS/DSCN MKR DOCD: CPT | Performed by: FAMILY MEDICINE

## 2021-01-21 PROCEDURE — G8400 PT W/DXA NO RESULTS DOC: HCPCS | Performed by: FAMILY MEDICINE

## 2021-01-21 PROCEDURE — G8417 CALC BMI ABV UP PARAM F/U: HCPCS | Performed by: FAMILY MEDICINE

## 2021-01-21 PROCEDURE — 1090F PRES/ABSN URINE INCON ASSESS: CPT | Performed by: FAMILY MEDICINE

## 2021-01-21 RX ORDER — AMOXICILLIN 250 MG
CAPSULE ORAL
COMMUNITY
End: 2021-01-01

## 2021-01-21 RX ORDER — TIMOLOL MALEATE 5 MG/ML
SOLUTION/ DROPS OPHTHALMIC
COMMUNITY
End: 2021-01-01

## 2021-01-21 ASSESSMENT — PATIENT HEALTH QUESTIONNAIRE - PHQ9
1. LITTLE INTEREST OR PLEASURE IN DOING THINGS: 0
SUM OF ALL RESPONSES TO PHQ9 QUESTIONS 1 & 2: 0

## 2021-01-21 NOTE — PROGRESS NOTES
1200 [facility]  [address]  Dept: [phone]  Dept FYY:[phone]    Dean Fagan is a 81 y.o. female who presents today for her medical conditions/complaints as notedbelow. Dean Fagan is c/o of Dizziness and Back Pain      HPI:     HPI    Has been feeling dizzy. Has been \"off\" for a long time and blamed it on the eye problem. About 1 week ago she got up in the morning and could not stand up. Had to literally hold on to something to even stay standing up. Actually had aching and stiffness in her shoulders and neck at the time also. Thursday last week was really bad then was better and then really bad again on Monday. Not sure if this got worse with movement. BP, HR and blood sugar were all normal with the episodes. \"feels like she is all over the place\" when she has it. Feels dizzy in the head and the eyes. Is nauseated with this. Decreased appetite with this. Has not been dong her exercises as much because of the dizziness. Has been able to stretch the tightness out of the shoulders. The pain that she is having is primarily in the upper neck and shoulders. Seems to have started about the same time as the dizziness did but no injuries or falls recently. Blood sugars have been okay and do not seem to be associated with highs or lows associated with the dizziness that she has had.     BP Readings from Last 3 Encounters:   01/21/21 122/82   12/03/20 130/70   09/10/20 128/68          (goal 120/80)    Wt Readings from Last 3 Encounters:   01/21/21 208 lb (94.3 kg)   12/03/20 205 lb (93 kg)   09/10/20 212 lb (96.2 kg)        Past Medical History:   Diagnosis Date    Anxiety     Carotid stenosis, bilateral     Cerebrovascular disease     Chronic kidney disease, stage 3     Emphysema of lung ([address]     Glaucoma     Hyperlipidemia     Hypertension     Pacemaker, artificial     Peripheral vascular disease of lower extremity ([facility] 12/17/2018    Type II diabetes mellitus, uncontrolled [facility]       Past Surgical History:   Procedure Laterality Date    CHOLECYSTECTOMY      COLONOSCOPY      EYE SURGERY      HYSTERECTOMY      PACEMAKER INSERTION         Family History   Problem Relation Age of Onset    Diabetes Mother     Heart Disease Father     Lung Cancer Sister        Social History     Tobacco Use    Smoking status: Never Smoker    Smokeless tobacco: Never Used   Substance Use Topics    Alcohol use: No      Current Outpatient Medications   Medication Sig Dispense Refill    Cobalamin Combinations (B-12) 100-5000 MCG SUBL vitamin N09 2,542 mcg-folic acid 565 mcg sublingual tablet   Place by sublingual route.  timolol (TIMOPTIC) 0.5 % ophthalmic solution timolol maleate 0.5 % eye drops   Apply 1 drop twice a day by ophthalmic route for 50 days.       Insulin Degludec (TRESIBA FLEXTOUCH) 100 UNIT/ML SOPN Inject 25 Units into the skin nightly 45 mL 3    furosemide (LASIX) 40 MG tablet take 1 tablet by mouth once daily 90 tablet 1    clopidogrel (PLAVIX) 75 MG tablet Take 1 tablet by mouth daily 90 tablet 1    nebivolol (BYSTOLIC) 5 MG tablet Take 1 tablet by mouth daily 90 tablet 1    atorvastatin (LIPITOR) 40 MG tablet TAKE 1 TABLET ONCE A DAY 90 tablet 1    Insulin Pen Needle (B-D UF III MINI PEN NEEDLES) 31G X 5 MM MISC Inject 1 each into the skin 4 times daily (before meals and nightly) 500 each 3    DULoxetine (CYMBALTA) 30 MG extended release capsule Take 1 capsule by mouth daily In addition to the 60 mg dose 30 capsule 5    Continuous Blood Gluc  (FREESTYLE KAHLIL 2 READER SYSTM) DANIEL 1 each by Does not apply route 4 times daily Test blood sugar 4 times daily 2 Device 12    Continuous Blood Gluc Sensor (FREESTYLE KAHLIL 14 DAY SENSOR) MISC 1 each by Does not apply route daily Tests blood sugar 4 times and as needed for fluctuating blood sugars 2 each 12    DULoxetine (CYMBALTA) 60 MG extended release capsule Take 1 capsule by mouth daily 90 capsule 3    insulin aspart (NOVOLOG FLEXPEN) 100 UNIT/ML injection pen 10 units before biggest meal and also sliding scale 16 pen 3    Elastic Bandages & Supports (WRIST SPLINT) MISC 2 each by Does not apply route nightly BILATERALLY 2 each 0    vitamin B-6 (PYRIDOXINE) 100 MG tablet Take 100 mg by mouth daily      acetaminophen (TYLENOL) 500 MG tablet Take 500 mg by mouth every 6 hours as needed for Pain      folic acid (FOLVITE) 1 MG tablet Take 1 mg by mouth daily      benzonatate (TESSALON) 100 MG capsule benzonatate 100 mg capsule   Take 1 capsule 3 times a day by oral route as needed.  latanoprost (XALATAN) 0.005 % ophthalmic solution 1 drop nightly      Handicap Placard MISC by Does not apply route The disability is expected to last lifetime  Dx: knee pain 1 each 0    apixaban (ELIQUIS) 2.5 MG TABS tablet Take 1 tablet by mouth 2 times daily 180 tablet 3    vitamin B-12 (CYANOCOBALAMIN) 1000 MCG tablet Take 1,000 mcg by mouth daily      psyllium (KONSYL) 28.3 % PACK Take 1 packet by mouth daily      melatonin 3 MG TABS tablet Take 3 mg by mouth nightly as needed        No current facility-administered medications for this visit. Allergies   Allergen Reactions    Cilostazol      Chest quivers and increased blood sugars  Elevated blood sugar    Ace Inhibitors      HYPERKALEMIA (even low dose)    Amitriptyline     Amlodipine      Other reaction(s): Dizziness    Ammonium Hydroxide     Amoxicillin     Avandia [Rosiglitazone]     Benzalkonium Chloride     Bifidobacterium     Brimonidine Tartrate     Codeine     Diclofenac     Dorzolamide     Etodolac     Feldene [Piroxicam]     Lactobacillus     Levaquin [Levofloxacin In D5w]      Sweating, irritable.     Lexapro [Escitalopram]     Metoprolol     Metronidazole     Olmesartan      Other reaction(s): Dizziness    Pantoprazole     Penicillins     Pilocarpine Hydrochloride [Pilocarpine]      Opthalmic solution. Gives headache      Prednisone     Propoxyphene      Darvocet    Qvar [Beclomethasone] Swelling    Streptococci     Travoprost     Vasotec [Enalapril Maleate]     Vicodin [Hydrocodone-Acetaminophen]     Voltaren [Diclofenac Sodium]        Health Maintenance   Topic Date Due    COVID-19 Vaccine (1 of 2) 09/20/1951    DTaP/Tdap/Td vaccine (1 - Tdap) 09/20/1955    Shingles Vaccine (1 of 2) 09/20/1986    DEXA (modify frequency per FRAX score)  09/20/1991    Annual Wellness Visit (AWV)  05/29/2019    Lipid screen  09/01/2021    Potassium monitoring  09/01/2021    Creatinine monitoring  09/01/2021    Flu vaccine  Completed    Pneumococcal 65+ years Vaccine  Completed    Hepatitis A vaccine  Aged Out    Hib vaccine  Aged Out    Meningococcal (ACWY) vaccine  Aged Out       Subjective:      Review of Systems    Objective:     /82 (Site: Left Upper Arm, Position: Sitting, Cuff Size: Large Adult)   Pulse 89   Wt 208 lb (94.3 kg)   SpO2 98%   BMI 32.08 kg/m²     Physical Exam  Vitals signs and nursing note reviewed. Constitutional:       Appearance: Normal appearance. She is well-developed. HENT:      Head: Normocephalic. Right Ear: External ear normal.      Left Ear: External ear normal.      Nose: Nose normal.      Mouth/Throat:      Pharynx: No oropharyngeal exudate or posterior oropharyngeal erythema. Eyes:      Conjunctiva/sclera: Conjunctivae normal.      Pupils: Pupils are equal, round, and reactive to light. Neck:      Musculoskeletal: Normal range of motion and neck supple. Thyroid: No thyromegaly. Vascular: Carotid bruit present. Comments: Bilateral decreased carotid upstrokes  Cardiovascular:      Rate and Rhythm: Normal rate. Pulses: Normal pulses. Heart sounds: Murmur present.       Comments: Irregular irregular rhythm with murmur at the LSB, bradycardic  Pulmonary:      Effort: Pulmonary effort is normal. No respiratory distress. Breath sounds: Normal breath sounds. No rales. Abdominal:      General: There is no distension. Palpations: Abdomen is soft. Tenderness: There is no abdominal tenderness. Musculoskeletal:        Back:       Right lower leg: Edema (Trace bilateral lower extremities) present. Left lower leg: Edema present. Skin:     Capillary Refill: Capillary refill takes less than 2 seconds. Findings: No erythema. Neurological:      General: No focal deficit present. Mental Status: She is alert and oriented to person, place, and time. Cranial Nerves: No cranial nerve deficit. Comments: Head tilt back reproduces her symptoms of vertigo although no true nystagmus is noted   Psychiatric:         Mood and Affect: Mood normal.         Behavior: Behavior normal.         Thought Content: Thought content normal.         Judgment: Judgment normal.         Assessment/Plan:      Diagnosis Orders   1. Benign paroxysmal positional vertigo due to bilateral vestibular disorder  External Referral To Physical Therapy   2. Anxiety     3. Chronic atrial fibrillation ([facility]     4. Acute bilateral thoracic back pain       Will refer to physical therapy for her BPV at this time. Encouraged always using the walker and extra careful moving while at home in light of this BPV. Do not feel is related to her atrial fibrillation but the anxiety certainly does appear a role in this. Stretches demonstrated for the back pain which I feel is related to muscle tension. This did seem to relieve her pain and she does state that she is already starting to get better. Can use ice or heat also as indicated for pain in addition to over-the-counter Tylenol.     Lab Results   Component Value Date    WBC 7.3 09/01/2020    HGB 11.5 (L) 09/01/2020    HCT 37.1 09/01/2020     09/01/2020    CHOL 76 09/01/2020    TRIG 83 09/01/2020    HDL 43 09/01/2020    ALT 17 09/01/2020    AST 21 09/01/2020    NA

## 2021-01-28 ENCOUNTER — PATIENT MESSAGE (OUTPATIENT)
Dept: FAMILY MEDICINE CLINIC | Age: 86
End: 2021-01-28

## 2021-01-30 RX ORDER — DULOXETIN HYDROCHLORIDE 30 MG/1
30 CAPSULE, DELAYED RELEASE ORAL DAILY
Qty: 90 CAPSULE | Refills: 3 | Status: SHIPPED | OUTPATIENT
Start: 2021-01-30 | End: 2021-01-01

## 2021-02-10 DIAGNOSIS — E11.21 DIABETIC GLOMERULOPATHY (HCC): ICD-10-CM

## 2021-02-10 DIAGNOSIS — E11.65 UNCONTROLLED TYPE 2 DIABETES MELLITUS WITH HYPERGLYCEMIA (HCC): ICD-10-CM

## 2021-02-10 RX ORDER — PEN NEEDLE, DIABETIC 31 GX5/16"
NEEDLE, DISPOSABLE MISCELLANEOUS
Qty: 100 EACH | Refills: 3 | Status: SHIPPED | OUTPATIENT
Start: 2021-02-10 | End: 2021-01-01

## 2021-02-10 NOTE — TELEPHONE ENCOUNTER
Tk Castro is calling to request a refill on the following medication(s):  Requested Prescriptions     Pending Prescriptions Disp Refills    B-D UF III MINI PEN NEEDLES 31G X 5 MM MISC [Pharmacy Med Name: BD MINI NDL  PEN [id number]] 100 each 3     Sig: USE AND DISCARD 1 PEN      NEEDLE TWO TIMES A DAY       Last Visit Date (If Applicable):  3/44/8904    Next Visit Date:    3/3/2021

## 2021-03-03 ENCOUNTER — OFFICE VISIT (OUTPATIENT)
Dept: FAMILY MEDICINE CLINIC | Age: 86
End: 2021-03-03
Payer: MEDICARE

## 2021-03-03 VITALS
OXYGEN SATURATION: 99 % | WEIGHT: 210 LBS | DIASTOLIC BLOOD PRESSURE: 80 MMHG | SYSTOLIC BLOOD PRESSURE: 130 MMHG | BODY MASS INDEX: 32.39 KG/M2 | HEART RATE: 91 BPM

## 2021-03-03 DIAGNOSIS — I65.23 CAROTID STENOSIS, BILATERAL: ICD-10-CM

## 2021-03-03 DIAGNOSIS — E11.21 TYPE 2 DIABETES MELLITUS WITH DIABETIC NEPHROPATHY, WITH LONG-TERM CURRENT USE OF INSULIN (HCC): Primary | ICD-10-CM

## 2021-03-03 DIAGNOSIS — H81.13 BENIGN PAROXYSMAL POSITIONAL VERTIGO DUE TO BILATERAL VESTIBULAR DISORDER: ICD-10-CM

## 2021-03-03 DIAGNOSIS — I10 ESSENTIAL HYPERTENSION: ICD-10-CM

## 2021-03-03 DIAGNOSIS — I48.20 CHRONIC ATRIAL FIBRILLATION (HCC): ICD-10-CM

## 2021-03-03 DIAGNOSIS — I73.9 PVD (PERIPHERAL VASCULAR DISEASE) (HCC): ICD-10-CM

## 2021-03-03 DIAGNOSIS — N18.4 STAGE 4 CHRONIC KIDNEY DISEASE (HCC): ICD-10-CM

## 2021-03-03 DIAGNOSIS — L98.492 CALLOUS ULCER WITH FAT LAYER EXPOSED (HCC): ICD-10-CM

## 2021-03-03 DIAGNOSIS — I25.10 CORONARY ARTERY DISEASE INVOLVING NATIVE HEART, ANGINA PRESENCE UNSPECIFIED, UNSPECIFIED VESSEL OR LESION TYPE: ICD-10-CM

## 2021-03-03 DIAGNOSIS — I50.42 HYPERTENSIVE HEART DISEASE WITH CHRONIC COMBINED SYSTOLIC AND DIASTOLIC CONGESTIVE HEART FAILURE (HCC): ICD-10-CM

## 2021-03-03 DIAGNOSIS — I11.0 HYPERTENSIVE HEART DISEASE WITH CHRONIC COMBINED SYSTOLIC AND DIASTOLIC CONGESTIVE HEART FAILURE (HCC): ICD-10-CM

## 2021-03-03 DIAGNOSIS — I34.0 NONRHEUMATIC MITRAL VALVE REGURGITATION: ICD-10-CM

## 2021-03-03 DIAGNOSIS — Z79.4 TYPE 2 DIABETES MELLITUS WITH DIABETIC NEPHROPATHY, WITH LONG-TERM CURRENT USE OF INSULIN (HCC): Primary | ICD-10-CM

## 2021-03-03 LAB — HBA1C MFR BLD: 7.3 %

## 2021-03-03 PROCEDURE — 1090F PRES/ABSN URINE INCON ASSESS: CPT | Performed by: FAMILY MEDICINE

## 2021-03-03 PROCEDURE — 4040F PNEUMOC VAC/ADMIN/RCVD: CPT | Performed by: FAMILY MEDICINE

## 2021-03-03 PROCEDURE — 1123F ACP DISCUSS/DSCN MKR DOCD: CPT | Performed by: FAMILY MEDICINE

## 2021-03-03 PROCEDURE — G8417 CALC BMI ABV UP PARAM F/U: HCPCS | Performed by: FAMILY MEDICINE

## 2021-03-03 PROCEDURE — 99214 OFFICE O/P EST MOD 30 MIN: CPT | Performed by: FAMILY MEDICINE

## 2021-03-03 PROCEDURE — 1036F TOBACCO NON-USER: CPT | Performed by: FAMILY MEDICINE

## 2021-03-03 PROCEDURE — 99213 OFFICE O/P EST LOW 20 MIN: CPT | Performed by: FAMILY MEDICINE

## 2021-03-03 PROCEDURE — G8400 PT W/DXA NO RESULTS DOC: HCPCS | Performed by: FAMILY MEDICINE

## 2021-03-03 PROCEDURE — G8427 DOCREV CUR MEDS BY ELIG CLIN: HCPCS | Performed by: FAMILY MEDICINE

## 2021-03-03 PROCEDURE — 83036 HEMOGLOBIN GLYCOSYLATED A1C: CPT | Performed by: FAMILY MEDICINE

## 2021-03-03 PROCEDURE — 3051F HG A1C>EQUAL 7.0%<8.0%: CPT | Performed by: FAMILY MEDICINE

## 2021-03-03 PROCEDURE — G8484 FLU IMMUNIZE NO ADMIN: HCPCS | Performed by: FAMILY MEDICINE

## 2021-03-03 ASSESSMENT — PATIENT HEALTH QUESTIONNAIRE - PHQ9
SUM OF ALL RESPONSES TO PHQ QUESTIONS 1-9: 0
2. FEELING DOWN, DEPRESSED OR HOPELESS: 0
1. LITTLE INTEREST OR PLEASURE IN DOING THINGS: 0

## 2021-03-03 NOTE — PROGRESS NOTES
[facility]  [address]  Dept: [phone]  Dept NNI:[phone]    Lorelei Campoverde is a 81 y.o. female who presents today for her medical conditions/complaints as notedbelow. Lorelei Campoverde is c/o of Diabetes (3 month f/u), Medication Check (3 month f/u), Dizziness (was having dizzy spells not so much anymore ), and Other (had an appt with carhazely has a leaky vaulve wanted to talk about that, and needs a script for new shoes)      HPI:     HPI    Did see [facility] cardiology NP last week. In light of her having some episodic dizzy spells they did recommend repeating echocardiogram which did show the moderate but difficult to quantify mitral regurgitation. Her EKG and device interrogation were unremarkable. Did have 3 really bad dizzy spells -- she was seen here in the office for some of these. At that time we felt there may be some element of BPV to her dizzy spells. She has done some physical therapy but this was all focusing on her strength and endurance, none related to her dizziness. She     Used to pedal about 30 minutes on her bicycle on the floor. Now she can only do about 20 minutes and then cannot catch her breath. Will get uncomfortable at that time- will feel like \"something running in her chest\"  Can rest and sometimes this will get better. This has been present for at least the last week or so. The cardiologist NP thought may also be related to the leaky valve. Has a follow up appt with Dr. Mere Amezcua in March with blood work beforehand. Lorelei Campoverde is a 81 y.o. female who presents for follow-up of hypertension, diabetes, and hyperlipidemia. She indicates that she is feeling well and denies any symptoms referable to her elevated blood pressure or diabetes. Specifically denies chest pain, palpitations, dyspnea, peripheral edema, thirst, frequent urination, and blurred vision.  Current medication regimen is as listed below. She denies any side effects of medication, and has been taking it regularly. Home glucose readings have been good. Mornings usually in the 100-120 range. Had 2 in the last 3 months that were 70. Upper end after meals with the novolog has been in the upper 100 to 200. Checks blood sugars 3-4 times per day. Last eye exam was CAROLINE Sampson . Diabetic complications includeretinopathy, nephropathy, neuropathy and CAD. shehas been exercising regularly her bike and therapy. [place] following a diabeticdiet. Has been using her continuous glucose monitor regularly and this has been very helpful with her vision and neuropathy issues at helping her maintain good compliance.     BP Readings from Last 3 Encounters:   03/03/21 130/80   01/21/21 122/82   12/03/20 130/70          (goal 120/80)    Wt Readings from Last 3 Encounters:   03/03/21 210 lb (95.3 kg)   01/21/21 208 lb (94.3 kg)   12/03/20 205 lb (93 kg)        Past Medical History:   Diagnosis Date    Anxiety     Carotid stenosis, bilateral     Cerebrovascular disease     Chronic kidney disease, stage 3     Emphysema of lung ([address]     Glaucoma     Hyperlipidemia     Hypertension     Pacemaker, artificial     Peripheral vascular disease of lower extremity ([address] 12/17/2018    Type II diabetes mellitus, uncontrolled ([address]       Past Surgical History:   Procedure Laterality Date    CHOLECYSTECTOMY      COLONOSCOPY      EYE SURGERY      HYSTERECTOMY      PACEMAKER INSERTION         Family History   Problem Relation Age of Onset    Diabetes Mother     Heart Disease Father     Lung Cancer Sister        Social History     Tobacco Use    Smoking status: Never Smoker    Smokeless tobacco: Never Used   Substance Use Topics    Alcohol use: No      Current Outpatient Medications   Medication Sig Dispense Refill    Multiple Vitamins-Minerals (PRESERVISION AREDS 2+MULTI VIT PO) Take by mouth      B-D UF III MINI PEN NEEDLES 31G X 5 MM MISC USE AND DISCARD 1 PEN      NEEDLE TWO TIMES A  each 3    DULoxetine (CYMBALTA) 30 MG extended release capsule Take 1 capsule by mouth daily In addition to the 60 mg dose 90 capsule 3    Cobalamin Combinations (B-12) 100-5000 MCG SUBL vitamin S69 3,215 mcg-folic acid 036 mcg sublingual tablet   Place by sublingual route.  timolol (TIMOPTIC) 0.5 % ophthalmic solution timolol maleate 0.5 % eye drops   Apply 1 drop twice a day by ophthalmic route for 50 days.  Insulin Degludec (TRESIBA FLEXTOUCH) 100 UNIT/ML SOPN Inject 25 Units into the skin nightly 45 mL 3    furosemide (LASIX) 40 MG tablet take 1 tablet by mouth once daily 90 tablet 1    clopidogrel (PLAVIX) 75 MG tablet Take 1 tablet by mouth daily 90 tablet 1    nebivolol (BYSTOLIC) 5 MG tablet Take 1 tablet by mouth daily 90 tablet 1    atorvastatin (LIPITOR) 40 MG tablet TAKE 1 TABLET ONCE A DAY 90 tablet 1    Continuous Blood Gluc  (FREESTYLE AKHLIL 2 READER SYSTM) DANIEL 1 each by Does not apply route 4 times daily Test blood sugar 4 times daily 2 Device 12    Continuous Blood Gluc Sensor (FREESTYLE KAHLIL 14 DAY SENSOR) MISC 1 each by Does not apply route daily Tests blood sugar 4 times and as needed for fluctuating blood sugars 2 each 12    DULoxetine (CYMBALTA) 60 MG extended release capsule Take 1 capsule by mouth daily 90 capsule 3    insulin aspart (NOVOLOG FLEXPEN) 100 UNIT/ML injection pen 10 units before biggest meal and also sliding scale 16 pen 3    Elastic Bandages & Supports (WRIST SPLINT) MISC 2 each by Does not apply route nightly BILATERALLY 2 each 0    vitamin B-6 (PYRIDOXINE) 100 MG tablet Take 100 mg by mouth daily      acetaminophen (TYLENOL) 500 MG tablet Take 500 mg by mouth every 6 hours as needed for Pain      folic acid (FOLVITE) 1 MG tablet Take 1 mg by mouth daily      benzonatate (TESSALON) 100 MG capsule benzonatate 100 mg capsule   Take 1 capsule 3 times a day by oral route as needed.       latanoprost (XALATAN) 0.005 % ophthalmic solution 1 drop nightly      Handicap Placard MISC by Does not apply route The disability is expected to last lifetime  Dx: knee pain 1 each 0    apixaban (ELIQUIS) 2.5 MG TABS tablet Take 1 tablet by mouth 2 times daily 180 tablet 3    vitamin B-12 (CYANOCOBALAMIN) 1000 MCG tablet Take 1,000 mcg by mouth daily      psyllium (KONSYL) 28.3 % PACK Take 1 packet by mouth daily      melatonin 3 MG TABS tablet Take 3 mg by mouth nightly as needed        No current facility-administered medications for this visit. Allergies   Allergen Reactions    Cilostazol      Chest quivers and increased blood sugars  Elevated blood sugar    Ace Inhibitors      HYPERKALEMIA (even low dose)    Amitriptyline     Amlodipine      Other reaction(s): Dizziness    Ammonium Hydroxide     Amoxicillin     Avandia [Rosiglitazone]     Benzalkonium Chloride     Bifidobacterium     Brimonidine Tartrate     Codeine     Diclofenac     Dorzolamide     Etodolac     Feldene [Piroxicam]     Lactobacillus     Levaquin [Levofloxacin In D5w]      Sweating, irritable.  Lexapro [Escitalopram]     Metoprolol     Metronidazole     Olmesartan      Other reaction(s): Dizziness    Pantoprazole     Penicillins     Pilocarpine Hydrochloride [Pilocarpine]      Opthalmic solution.  Gives headache      Prednisone     Propoxyphene      Darvocet    Qvar [Beclomethasone] Swelling    Streptococci     Travoprost     Vasotec [Enalapril Maleate]     Vicodin [Hydrocodone-Acetaminophen]     Voltaren [Diclofenac Sodium]        Health Maintenance   Topic Date Due    COVID-19 Vaccine (1 of 2) Never done    DTaP/Tdap/Td vaccine (1 - Tdap) Never done    Shingles Vaccine (1 of 2) Never done    DEXA (modify frequency per FRAX score)  Never done   [employer] Visit (AWV)  Never done    Lipid screen  09/01/2021    Potassium monitoring  09/01/2021    Creatinine monitoring  09/01/2021    Flu vaccine Completed    Pneumococcal 65+ years Vaccine  Completed    Hepatitis A vaccine  Aged Out    Hib vaccine  Aged Out    Meningococcal (ACWY) vaccine  Aged Out       Subjective:      Review of Systems    Objective:     /80 (Site: Left Upper Arm, Position: Sitting, Cuff Size: Large Adult)   Pulse 91   Wt 210 lb (95.3 kg)   SpO2 99%   BMI 32.39 kg/m²     Physical Exam  Vitals signs and nursing note reviewed. Constitutional:       Appearance: Normal appearance. She is well-developed. HENT:      Head: Normocephalic. Right Ear: External ear normal.      Left Ear: External ear normal.      Nose: Nose normal.      Mouth/Throat:      Pharynx: No oropharyngeal exudate or posterior oropharyngeal erythema. Eyes:      Conjunctiva/sclera: Conjunctivae normal.      Pupils: Pupils are equal, round, and reactive to light. Neck:      Musculoskeletal: Normal range of motion and neck supple. Thyroid: No thyromegaly. Vascular: Carotid bruit present. Comments: Bilateral decreased carotid upstrokes  Cardiovascular:      Rate and Rhythm: Normal rate. Pulses: Normal pulses. Heart sounds: Murmur present. Comments: Irregular irregular rhythm with murmur at the LSB, bradycardic  Pulmonary:      Effort: Pulmonary effort is normal. No respiratory distress. Breath sounds: Normal breath sounds. No rales. Abdominal:      General: There is no distension. Palpations: Abdomen is soft. Tenderness: There is no abdominal tenderness. Musculoskeletal:      Right lower leg: Edema (Trace bilateral lower extremities) present. Left lower leg: Edema present. Skin:     Capillary Refill: Capillary refill takes less than 2 seconds. Findings: No erythema. Neurological:      General: No focal deficit present. Mental Status: She is alert and oriented to person, place, and time. Cranial Nerves: No cranial nerve deficit.       Comments: Head tilt back reproduces her symptoms of vertigo although no true nystagmus is noted   Psychiatric:         Mood and Affect: Mood normal.         Behavior: Behavior normal.         Thought Content: Thought content normal.         Judgment: Judgment normal.         Assessment/Plan:      Diagnosis Orders   1. Type 2 diabetes mellitus with diabetic nephropathy, with long-term current use of insulin ([facility])     2. Stage 4 chronic kidney disease ([facility]     3. Coronary artery disease involving native heart, angina presence unspecified, unspecified vessel or lesion type     4. Chronic atrial fibrillation ([facility])     5. Essential hypertension  ECHO Complete 2D W Doppler W Color   6. Carotid stenosis, bilateral     7. Nonrheumatic mitral valve regurgitation  ECHO Complete 2D W Doppler W Color   8. 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 Callous ulcer with fat layer exposed ([facility]     10. 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 PVD (peripheral vascular disease) ([facility])       Hemoglobin A1c is well controlled at this time. Continue on her current medications. Continue trying to stick at this point activity as she can. Continue her follow-up with nephrology. Kidney disease has been stable. Has the underlying history of coronary artery disease. Will monitor closely but need to consider repeat cardiac testing if she continues to have the dyspnea with exertion. Atrial fibrillation has been stable has had recent cardiology follow-up. Hypertension is well controlled and asymptomatic at this time as well. Will refer to physical therapy for Epley's maneuvers for the dizziness which appears to have some vertigo component. Continue risk factor reduction for her peripheral vascular disease and ongoing struggles with callus and recurrent wounds.     Asymptomatic from the carotid stenosis standpoint continue follow-up with vascular as scheduled. Lab Results   Component Value Date    WBC 7.3 09/01/2020    HGB 11.5 (L) 09/01/2020    HCT 37.1 09/01/2020     09/01/2020    CHOL 76 09/01/2020    TRIG 83 09/01/2020    HDL 43 09/01/2020    ALT 17 09/01/2020    AST 21 09/01/2020     09/01/2020     09/01/2020    K 4.6 09/01/2020    K 4.6 09/01/2020     09/01/2020     09/01/2020    CREATININE 1.64 (H) 09/01/2020    CREATININE 1.64 (H) 09/01/2020    BUN 42 (H) 09/01/2020    BUN 42 (H) 09/01/2020    CO2 25 09/01/2020    CO2 25 09/01/2020    TSH 0.775 03/02/2018    INR 1.32 (H) 02/06/2020    LABA1C 7.3 03/03/2021    LABA1C 7.5 12/03/2020    LABA1C 6.7 09/10/2020       Return in about 3 months (around 6/3/2021) for HTN. Patient given educational materials - see patientinstructions. Discussed use, benefit, and side effects of prescribed medications. All patient questions answered. Pt voiced understanding. Reviewed health maintenance. Instructed to continue current medications, diet andexercise. Patient agreed with treatment plan. Follow up as directed.      (Please note that portions of this note were completed with a voice-recognition program. Efforts were made to edit the dictation but occasionally words are mis-transcribed.)    Electronically signed by Fara Zuluaga MD on 3/7/2021

## 2021-03-03 NOTE — PROGRESS NOTES
Did have 3 really bad dizzy spells -- she was seen here in the office for some of these. At that time we felt there may be some element of BPV to her dizzy spells. She has done some physical therapy but this was all focusing on her strength and endurance, none related to her dizziness. She     Used to pedal about 30 minutes on her bicycle on the floor. Now she can only do about 20 minutes and then cannot catch her breath. Will get uncomfortable at that time- will feel like \"something running in her chest\"  Can rest and sometimes this will get better. This has been present for at least the last week or so. The cardiologist NP thought may also be related to the leaky valve. Has a follow up appt with Dr. Claire Mauricio in March with blood work beforehand.

## 2021-03-11 LAB
ANION GAP SERPL CALCULATED.3IONS-SCNC: 11 MEQ/L (ref 5–13)
BUN BLDV-MCNC: 51 MG/DL (ref 7–18)
CALCIUM SERPL-MCNC: 8.6 MG/DL (ref 8.4–10.2)
CHLORIDE BLD-SCNC: 102 MMOL/L (ref 98–107)
CO2: 24 MMOL/L (ref 23–31)
CREAT SERPL-MCNC: 1.69 MG/DL (ref 0.57–1.11)
CREATININE URINE: 27 MG/DL (ref 47–110)
GFR CALCULATED: 31
GLUCOSE: 183 MG/DL (ref 70–99)
HCT VFR BLD CALC: 38.3 % (ref 34.6–44.1)
HEMOGLOBIN: 12 G/DL (ref 11.7–14.9)
MAGNESIUM: 2.2 MG/DL (ref 1.6–2.6)
MCH RBC QN AUTO: 31.7 PG (ref 27.8–33.2)
MCHC RBC AUTO-ENTMCNC: 31.3 G/DL (ref 32.7–34.8)
MCV RBC AUTO: 101.1 FL (ref 83–97.4)
PDW BLD-RTO: 13.6 % (ref 12.2–15.8)
PHOSPHORUS: 3.9 MG/DL (ref 2.5–4.9)
PLATELET # BLD: 215 10'3/UL (ref 122–359)
PMV BLD AUTO: 10.4 FL (ref 7.6–10.6)
POTASSIUM SERPL-SCNC: 5 MMOL/L (ref 3.5–5.1)
PROTEIN, URINE: NEGATIVE MG/DL
RBC # BLD: 3.79 10'6/UL (ref 3.85–4.88)
SODIUM BLD-SCNC: 137 MMOL/L (ref 136–145)
TOTAL PROTEIN, URINE: < 6.8 MG/DL
WBC: 6.6 10'3/UL (ref 3.2–9.3)

## 2021-03-12 DIAGNOSIS — I34.0 NONRHEUMATIC MITRAL VALVE REGURGITATION: ICD-10-CM

## 2021-03-12 DIAGNOSIS — I10 ESSENTIAL HYPERTENSION: ICD-10-CM

## 2021-04-01 DIAGNOSIS — E11.22 TYPE 2 DIABETES MELLITUS WITH STAGE 1 CHRONIC KIDNEY DISEASE, WITH LONG-TERM CURRENT USE OF INSULIN (HCC): ICD-10-CM

## 2021-04-01 DIAGNOSIS — N18.1 TYPE 2 DIABETES MELLITUS WITH STAGE 1 CHRONIC KIDNEY DISEASE, WITH LONG-TERM CURRENT USE OF INSULIN (HCC): ICD-10-CM

## 2021-04-01 DIAGNOSIS — Z79.4 TYPE 2 DIABETES MELLITUS WITH STAGE 1 CHRONIC KIDNEY DISEASE, WITH LONG-TERM CURRENT USE OF INSULIN (HCC): ICD-10-CM

## 2021-04-05 RX ORDER — INSULIN ASPART 100 [IU]/ML
INJECTION, SOLUTION INTRAVENOUS; SUBCUTANEOUS
Qty: 15 ML | Refills: 3 | Status: SHIPPED | OUTPATIENT
Start: 2021-04-05 | End: 2022-01-01

## 2021-04-05 NOTE — TELEPHONE ENCOUNTER
Nicky Moraes is requesting a refill on the following medication(s):  Requested Prescriptions     Pending Prescriptions Disp Refills    insulin aspart (NOVOLOG FLEXPEN) 100 UNIT/ML injection pen [Pharmacy Med Name: Afshin Prabhakar F/P  PEN 100U/ML] 15 mL 3     Sig: INJECT 10 UNITS            SUBCUTANEOUSLY BEFORE      BIGGEST MEAL AND ALSO      SLIDING SCALE       Last Visit Date (If Applicable):  4/9/1667    Next Visit Date:    6/4/2021

## 2021-04-08 ENCOUNTER — VIRTUAL VISIT (OUTPATIENT)
Dept: FAMILY MEDICINE CLINIC | Age: 86
End: 2021-04-08
Payer: MEDICARE

## 2021-04-08 ENCOUNTER — TELEPHONE (OUTPATIENT)
Dept: FAMILY MEDICINE CLINIC | Age: 86
End: 2021-04-08

## 2021-04-08 DIAGNOSIS — H81.10 BENIGN PAROXYSMAL POSITIONAL VERTIGO, UNSPECIFIED LATERALITY: Primary | ICD-10-CM

## 2021-04-08 PROCEDURE — 99441 PR PHYS/QHP TELEPHONE EVALUATION 5-10 MIN: CPT | Performed by: FAMILY MEDICINE

## 2021-04-08 NOTE — PROGRESS NOTES
Irene Summers is a 81 y.o. female evaluated via telephone on 4/8/2021. Consent:  She and/or health care decision maker is aware that that she may receive a bill for this telephone service, depending on her insurance coverage, and has provided verbal consent to proceed: Yes      Documentation:  I communicated with the patient and/or health care decision maker about vertigo symptoms. Details of this discussion including any medical advice provided: See below. Trixie Ruano out of bed on March 18th- was sound asleep and rolled over. Now she is dizzy but not all the time only when she leans he head over or tilts her head back-- did hit her cheek on the night stand but that has healed. Had a lump on her forehead and that has healed also-- on the left. Also hit her knees on the floor and they were tender-- they are also better than they were at the time of the fall. Has had dizziness ever since then. similar to the episodes she had in the past with her vertigo, just not quite as bad as then. Has not had any headaches or other neurologic symptoms. No neck pain. Using the cane and the walker in the house now also because with this fall has been unsteady. Diagnosis Orders   1. Benign paroxysmal positional vertigo, unspecified laterality  [facility]     We will go ahead and refer Roxanne back to physical therapy for evaluation and treatment of the recurrent episode of vertigo. With the recent fall I do not believe that she has caused any other significant injury but if she develops any significant headaches any persistent nausea any new neurologic symptoms reviewed with her that that would prompt urgent evaluation. I affirm this is a Patient Initiated Episode with a Patient who has not had a related appointment within my department in the past 7 days or scheduled within the next 24 hours.     Patient identification was verified at the start of the visit: Yes    Total Time: minutes: 5-10 minutes (8 minutes with 2 additional minutes in chart prep)    The visit was conducted pursuant to the emergency declaration under the [address] [place] [address] and the [email or web address] General Act. Patient identification was verified, and a caregiver was present when appropriate. The patient was located in a state where the provider was credentialed to provide care.     Note: not billable if this call serves to triage the patient into an appointment for the relevant concern      Angelina Zhao

## 2021-04-21 NOTE — TELEPHONE ENCOUNTER
Received fax from [employer] asking for an addendum added to patient's chart notes dated 3/3/21 stating patient is in compliance with the use of the continuous glucose monitor. Chart notes then need to be faxed to [employer]. Please addend note if appropriate.

## 2021-06-04 NOTE — PROGRESS NOTES
[address] [facility]  [address]  Dept: [phone]  Dept INW:[phone]    Irene Summers is a 81 y.o. female who presents today for her medical conditions/complaints as notedbelow. Irene Summers is c/o of Diabetes (3mo follow up), Dizziness (Has been doing really well but dizziness started again this morning. ), Tinnitus (buzzing in the left ear for the past 3 months), and Numbness (feet have been more numb recently - gets cramps in her legs, feet, and hands)      HPI:     HPI  Irene Summers is a 81 y.o. female who presents for follow-up of hypertension, diabetes, and hyperlipidemia. She indicates that she is feeling well and denies any symptoms referable to her elevated blood pressure or diabetes. Specifically denies chest pain, palpitations, dyspnea, peripheral edema, thirst, frequent urination, and blurred vision. Current medication regimen is as listed below. She denies any side effects of medication, and has been taking it regularly. Home glucose readings have been good- usually in the 140 range in the mornings and below 200 in the afternoons. Checks blood sugars at least 2 times daily, often more with flucuating blood sugars. Diabetic complications includeretinopathy, nephropathy, neuropathy and CAD. shehas been exercising regularly doing her home exercise routine and staying as active as she can. Isabel following a diabeticdiet. Was at Dr. Julianna Wu in the fall and was told that she was to have some testing done. The Vertigo was much better after she had the PT treatment. Was so much better. Then she fell and her vertigo came back when she hit her head ( in March) the dizziness has been persistent since then. Will get shivering shaking in her middle-- occasional nausea does not correlate with the shaking in her middle.      The swallowing seems a little worse -- solid meats, apples, does OK with most other things, eats slow, chews food well. Feels like things are stuck, coughs a lot when she eats. Does not think this is getting worse really.     BP Readings from Last 3 Encounters:   06/04/21 110/78   03/03/21 130/80   01/21/21 122/82          (goal 120/80)    Wt Readings from Last 3 Encounters:   06/04/21 211 lb 3.2 oz (95.8 kg)   03/03/21 210 lb (95.3 kg)   01/21/21 208 lb (94.3 kg)        Past Medical History:   Diagnosis Date    Anxiety     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 ([facility] [address]     Glaucoma     Hyperlipidemia     Hypertension     Pacemaker, artificial     Peripheral vascular disease of lower extremity ([facility] [address] 12/17/2018    Type II diabetes mellitus, uncontrolled ([facility] [address]       Past Surgical History:   Procedure Laterality Date    CHOLECYSTECTOMY      COLONOSCOPY      EYE SURGERY      HYSTERECTOMY      PACEMAKER INSERTION         Family History   Problem Relation Age of Onset    Diabetes Mother     Heart Disease Father     Lung Cancer Sister        Social History     Tobacco Use    Smoking status: Never Smoker    Smokeless tobacco: Never Used   Substance Use Topics    Alcohol use: No      Current Outpatient Medications   Medication Sig Dispense Refill    insulin aspart (NOVOLOG FLEXPEN) 100 UNIT/ML injection pen INJECT 10 UNITS            SUBCUTANEOUSLY BEFORE      BIGGEST MEAL AND ALSO      SLIDING SCALE 15 mL 3    Multiple Vitamins-Minerals (PRESERVISION AREDS 2+MULTI VIT PO) Take by mouth      Insulin Degludec (TRESIBA FLEXTOUCH) 100 UNIT/ML SOPN Inject 25 Units into the skin nightly 45 mL 3    furosemide (LASIX) 40 MG tablet take 1 tablet by mouth once daily 90 tablet 1    clopidogrel (PLAVIX) 75 MG tablet Take 1 tablet by mouth daily 90 tablet 1    nebivolol (BYSTOLIC) 5 MG tablet Take 1 tablet by mouth daily 90 tablet 1    atorvastatin (LIPITOR) 40 MG tablet TAKE 1 TABLET ONCE A DAY 90 tablet 1    DULoxetine (CYMBALTA) 60 MG extended release capsule Take 1 capsule by mouth daily 90 capsule 3    vitamin B-6 (PYRIDOXINE) 100 MG tablet Take 100 mg by mouth daily      acetaminophen (TYLENOL) 500 MG tablet Take 500 mg by mouth every 6 hours as needed for Pain      folic acid (FOLVITE) 1 MG tablet Take 1 mg by mouth daily      latanoprost (XALATAN) 0.005 % ophthalmic solution 1 drop nightly      apixaban (ELIQUIS) 2.5 MG TABS tablet Take 1 tablet by mouth 2 times daily 180 tablet 3    vitamin B-12 (CYANOCOBALAMIN) 1000 MCG tablet Take 1,000 mcg by mouth daily      psyllium (KONSYL) 28.3 % PACK Take 1 packet by mouth daily      melatonin 3 MG TABS tablet Take 3 mg by mouth nightly as needed       B-D UF III MINI PEN NEEDLES 31G X 5 MM MISC USE AND DISCARD 1 PEN      NEEDLE TWO TIMES A  each 3    Continuous Blood Gluc  (FREESTYLE KAHLIL 2 READER SYSTM) DANIEL 1 each by Does not apply route 4 times daily Test blood sugar 4 times daily 2 Device 12    Continuous Blood Gluc Sensor (FREESTYLE KAHLIL 14 DAY SENSOR) MISC 1 each by Does not apply route daily Tests blood sugar 4 times and as needed for fluctuating blood sugars 2 each 12    Elastic Bandages & Supports (WRIST SPLINT) MISC 2 each by Does not apply route nightly BILATERALLY 2 each 0    Handicap Placard MISC by Does not apply route The disability is expected to last lifetime  Dx: knee pain 1 each 0     No current facility-administered medications for this visit.      Allergies   Allergen Reactions    Cilostazol      Chest quivers and increased blood sugars  Elevated blood sugar    Ace Inhibitors      HYPERKALEMIA (even low dose)    Amitriptyline     Amlodipine      Other reaction(s): Dizziness    Ammonium Hydroxide     Amoxicillin     Avandia [Rosiglitazone]     Benzalkonium Chloride     Bifidobacterium     Brimonidine Tartrate     Codeine     Diclofenac     Dorzolamide     Etodolac     Feldene [Piroxicam]     Lactobacillus     Levaquin [Levofloxacin In D5w]      Sweating, irritable.  Lexapro [Escitalopram]     Metoprolol     Metronidazole     Olmesartan      Other reaction(s): Dizziness    Pantoprazole     Penicillins     Pilocarpine Hydrochloride [Pilocarpine]      Opthalmic solution. Gives headache      Prednisone     Propoxyphene      Darvocet    Qvar [Beclomethasone] Swelling    Streptococci     Travoprost     Vasotec [Enalapril Maleate]     Vicodin [Hydrocodone-Acetaminophen]     Voltaren [Diclofenac Sodium]        Health Maintenance   Topic Date Due    DTaP/Tdap/Td vaccine (1 - Tdap) Never done    Shingles Vaccine (1 of 2) Never done    DEXA (modify frequency per FRAX score)  Never done    Pneumococcal 65+ yrs at Risk Vaccine (2 of 2 - PCV13) 07/23/2009    Annual Wellness Visit (AWV)  Never done    Lipid screen  09/01/2021    Potassium monitoring  03/11/2022    Creatinine monitoring  03/11/2022    Flu vaccine  Completed    COVID-19 Vaccine  Completed    Hepatitis A vaccine  Aged Out    Hib vaccine  Aged Out    Meningococcal (ACWY) vaccine  Aged Out       Subjective:      Review of Systems    Objective:     /78 (Site: Left Upper Arm, Position: Sitting, Cuff Size: Large Adult)   Pulse 97   Ht 5' 7.5\" (1.715 m)   Wt 211 lb 3.2 oz (95.8 kg)   SpO2 98%   BMI 32.59 kg/m²     Physical Exam  Vitals and nursing note reviewed. Constitutional:       Appearance: Normal appearance. She is well-developed. HENT:      Head: Normocephalic. Right Ear: External ear normal.      Left Ear: External ear normal.      Nose: Nose normal.      Mouth/Throat:      Pharynx: No oropharyngeal exudate or posterior oropharyngeal erythema. Eyes:      Conjunctiva/sclera: Conjunctivae normal.      Pupils: Pupils are equal, round, and reactive to light. Neck:      Thyroid: No thyromegaly. Vascular: Carotid bruit present.       Comments: Bilateral decreased carotid upstrokes  Cardiovascular:      Rate and Rhythm: Normal rate. Pulses: Normal pulses. Heart sounds: Murmur heard. Comments: Irregular irregular rhythm with murmur at the LSB, bradycardic  Pulmonary:      Effort: Pulmonary effort is normal. No respiratory distress. Breath sounds: Normal breath sounds. No rales. Abdominal:      General: There is no distension. Palpations: Abdomen is soft. Tenderness: There is no abdominal tenderness. Musculoskeletal:      Cervical back: Normal range of motion and neck supple. Right lower leg: Edema (Trace bilateral lower extremities) present. Left lower leg: Edema present. Skin:     Capillary Refill: Capillary refill takes less than 2 seconds. Findings: No erythema. Neurological:      General: No focal deficit present. Mental Status: She is alert and oriented to person, place, and time. Cranial Nerves: No cranial nerve deficit. Psychiatric:         Mood and Affect: Mood normal.         Behavior: Behavior normal.         Thought Content: Thought content normal.         Judgment: Judgment normal.         Assessment/Plan:     1. Type 2 diabetes mellitus with stage 1 chronic kidney disease, with long-term current use of insulin (HCC)  -     POCT glycosylated hemoglobin (Hb A1C)  -     Comprehensive Metabolic Panel; Future  -     Lipid Panel; Future  2. Stage 4 chronic kidney disease ([facility] [address]  Assessment & Plan:   Stable continue with current medications and follow-up  3. Benign paroxysmal positional vertigo, unspecified laterality  4. Chronic atrial fibrillation (HCC)  Assessment & Plan:   Asymptomatic well-controlled at this time. Continue with the Plavix for her stroke prophylaxis  Orders:  -     CBC Auto Differential; Future  5. Essential hypertension  Assessment & Plan:   Well-controlled at this time. No changes in her current medications  6. Diabetic glomerulopathy ([facility] [address]  -     Comprehensive Metabolic Panel; Future  7.  Menopause  -     DEXA BONE DENSITY 2 SITES; Future  8. Type 2 diabetes mellitus with diabetic nephropathy, with long-term current use of insulin ([address]  Assessment & Plan:   Diabetes very well controlled at this time. Continue the frequent glucose checks to help with her labile blood sugars. No low sugars at this time. Lab Results   Component Value Date    WBC 6.6 03/11/2021    HGB 12.0 03/11/2021    HCT 38.3 03/11/2021     03/11/2021    CHOL 76 09/01/2020    TRIG 83 09/01/2020    HDL 43 09/01/2020    ALT 17 09/01/2020    AST 21 09/01/2020     03/11/2021    K 5.0 03/11/2021     03/11/2021    CREATININE 1.69 (H) 03/11/2021    BUN 51 (H) 03/11/2021    CO2 24 03/11/2021    TSH 0.775 03/02/2018    INR 1.32 (H) 02/06/2020    LABA1C 7.3 06/04/2021    LABA1C 7.3 03/03/2021    LABA1C 7.5 12/03/2020       Return in about 4 months (around 10/4/2021). Patient given educational materials - see patientinstructions. Discussed use, benefit, and side effects of prescribed medications. All patient questions answered. Pt voiced understanding. Reviewed health maintenance. Instructed to continue current medications, diet andexercise. Patient agreed with treatment plan. Follow up as directed.      (Please note that portions of this note were completed with a voice-recognition program. Efforts were made to edit the dictation but occasionally words are mis-transcribed.)    Electronically signed by Harjinder Pedroza MD on 6/8/2021

## 2021-06-09 NOTE — ASSESSMENT & PLAN NOTE
Diabetes very well controlled at this time. Continue the frequent glucose checks to help with her labile blood sugars. No low sugars at this time.

## 2021-06-28 NOTE — TELEPHONE ENCOUNTER
Patient called today asking if  had any information on a referral to vascular for her leg pain. She said when she was here 6/4/21 they talked about a referral to vascular for her leg pain.

## 2021-06-28 NOTE — TELEPHONE ENCOUNTER
Patient is seen vascular multiple times about the blood supply in her legs. I do not see a note readily in the chart from her vascular surgeons in the past.  Please check to the chart and find out who she was seeing and give them a call to see about getting the patient rescheduled.

## 2021-07-08 NOTE — TELEPHONE ENCOUNTER
Beto Mustafa is requesting a refill on the following medication(s):  Requested Prescriptions     Pending Prescriptions Disp Refills    apixaban (ELIQUIS) 2.5 MG TABS tablet 180 tablet 3     Sig: Take 1 tablet by mouth 2 times daily       Last Visit Date (If Applicable):  6/0/2268    Next Visit Date:    9/23/2021

## 2021-07-08 NOTE — TELEPHONE ENCOUNTER
Refill please. Also she wants to know if she is supposed to stop B-12 vitamin. Saw it on her visit summary.

## 2021-07-14 NOTE — TELEPHONE ENCOUNTER
Gonzalez Ang is requesting a refill on the following medication(s):  Requested Prescriptions     Pending Prescriptions Disp Refills    atorvastatin (LIPITOR) 40 MG tablet [Pharmacy Med Name: ATORVASTATIN TAB 40MG] 90 tablet 1     Sig: TAKE 1 TABLET ONCE DAILY    furosemide (LASIX) 40 MG tablet [Pharmacy Med Name: FUROSEMIDE TAB 40MG] 90 tablet 1     Sig: TAKE 1 TABLET ONCE DAILY       Last Visit Date (If Applicable):  2/7/4336    Next Visit Date:    9/23/2021

## 2021-07-19 NOTE — TELEPHONE ENCOUNTER
Cindy Moore called said Eugene Garcia had a fall 5:00 this morning, has bruising and skin tears, was dizzy and would like to discuss this, please call son Aide Marx at [phone].

## 2021-07-19 NOTE — TELEPHONE ENCOUNTER
Aries notified by phone. He is going to talk with Saray Lopez and will let us know if she wants a PT appointment.

## 2021-07-19 NOTE — TELEPHONE ENCOUNTER
Thanks for the update. If this is similar to her previous episodes of vertigo then would consider a referral to PT again -- see what Topher Berry thinks.

## 2021-07-19 NOTE — TELEPHONE ENCOUNTER
Arlyn Ji states mom got dizzy this morning and fell down around 5am. She has a few bruises, a scrape on her toe, and a skin tear on her left wrist, but seems ok otherwise. BS at 6am was 144, BP at 11am 127/83. She has been noticing some more dizziness recently. Prior to this she has been fine since her PT in April. Arlyn Ji was at the house at 10 am and she seemed fine at that time. Patient has appointment tomorrow with vascular doctor. They are going to have that doc look at the toe to see if anything further needs done. He just wanted to let Dr Mendoza Jones know what is going on with her.

## 2021-08-16 NOTE — TELEPHONE ENCOUNTER
Benjamin Lombardi is requesting a refill on the following medication(s):  Requested Prescriptions     Pending Prescriptions Disp Refills    clopidogrel (PLAVIX) 75 MG tablet [Pharmacy Med Name: CLOPIDOGREL  TAB 75MG] 90 tablet 1     Sig: TAKE 1 TABLET DAILY       Last Visit Date (If Applicable):  6/5/4196    Next Visit Date:    9/23/2021

## 2021-09-20 NOTE — TELEPHONE ENCOUNTER
Lyn Joya is requesting a refill on the following medication(s):  Requested Prescriptions     Pending Prescriptions Disp Refills    DULoxetine (CYMBALTA) 60 MG extended release capsule [Pharmacy Med Name: DULOXETINE CAP 60MG DR] 90 capsule 3     Sig: TAKE 1 CAPSULE DAILY       Last Visit Date (If Applicable):  8/5/9385    Next Visit Date:    9/23/2021

## 2021-09-23 NOTE — PROGRESS NOTES
Have you had an allergic reaction to the flu (influenza) shot? no  Are you allergic to eggs or any component of the flu vaccine? no  Do you have a history of Guillain-[place] Syndrome (GBS), which is paralysis after receiving the flu vaccine? no  Are you feeling well today? yes  Flu vaccine given as ordered. Patient tolerated it well. No questions re: VIS information.

## 2021-09-23 NOTE — PROGRESS NOTES
[address] [facility]  [address]  Dept: [phone]  Dept IYF:[phone]    Yissel Elder is a 81 y.o. female who presents today for her medical conditions/complaints as notedbelow. Yissel Elder is c/o of Diabetes (4mo follow up) and Leg Pain (gets cramps in her legs - right worse than left. Happens in her arms and back also. Causes her terrible pain)      HPI:     HPI  Yissel Elder is a 81 y.o. female who presents for follow-up of hypertension, diabetes, and hyperlipidemia. She indicates that she is feeling well and denies any symptoms referable to her elevated blood pressure or diabetes. Specifically denies chest pain, palpitations, dyspnea, peripheral edema, thirst, frequent urination, and blurred vision. Current medication regimen is as listed below. She denies any side effects of medication, and has been taking it regularly. Home glucose readings have been good- usually 100-250 through out the day. Checks blood sugars 4 times daily and uses the sliding scale with the 10 units with her biggest meal. Last eye exam was July 22nd or so ago withErika. Diabetic complications includeretinopathy, nephropathy, neuropathy and CAD. jenniferhas been exercising regularly with her foot bicycle. Haskiran following a diabeticdiet. Deven Ruiz has continued to use the continuous glucose monitor 4-6 times daily and as needed to monitor her diabetes. She is tolerating this well and has no questions or concerns regarding its use. Roxanne did recently have arterial Dopplers which showed significantly decreased ABIs on bilateral lower extremities. She also had vascular follow-up with Dr. Shiva Tate to try to intervene but they were unable to revascularize the left lower extremity. The only other option would be open pedal access if critical limb ischemia were to develop.     Recent labs show her lipid panel is at goal and he basic metabolic is stable with a creatinine of 1.75 and a BUN of 55    CBC is also stable with a hemoglobin of 11.8 and a hematocrit of 35.3. In [facility] his preop evaluation in August on the lateral view an anterior nodular density of uncertain etiology was noted. A CT scan without contrast was recommended for further evaluation. Has not had any new falls since her last visit. Still has the right knee pain. No change. Still does the pedaling regularly. Tolerates this well. Would like to go back to the chiropractor to see if this can help her back. Has the severe muscle cramps in her legs and arms-- usually at night when she tries to sleep.      BP Readings from Last 3 Encounters:   09/23/21 128/68   06/04/21 110/78   03/03/21 130/80          (goal 120/80)    Wt Readings from Last 3 Encounters:   09/23/21 210 lb (95.3 kg)   06/04/21 211 lb 3.2 oz (95.8 kg)   03/03/21 210 lb (95.3 kg)        Past Medical History:   Diagnosis Date    Anxiety     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 ([address]     Glaucoma     Hyperlipidemia     Hypertension     Pacemaker, artificial     Peripheral vascular disease of lower extremity ([address] 12/17/2018    Type II diabetes mellitus, uncontrolled ([address]       Past Surgical History:   Procedure Laterality Date    CHOLECYSTECTOMY      COLONOSCOPY      EYE SURGERY      HYSTERECTOMY      PACEMAKER INSERTION         Family History   Problem Relation Age of Onset    Diabetes Mother     Heart Disease Father     Lung Cancer Sister        Social History     Tobacco Use    Smoking status: Never Smoker    Smokeless tobacco: Never Used   Substance Use Topics    Alcohol use: No      Current Outpatient Medications   Medication Sig Dispense Refill    baclofen (LIORESAL) 10 MG tablet 1/2 to 1 tablet at night for cramps as needed 30 tablet 1    DULoxetine (CYMBALTA) 60 MG extended release capsule TAKE 1 CAPSULE DAILY 90 capsule 3    clopidogrel (PLAVIX) 75 MG tablet TAKE 1 TABLET DAILY 90 tablet 1    atorvastatin (LIPITOR) 40 MG tablet TAKE 1 TABLET ONCE DAILY 90 tablet 1    furosemide (LASIX) 40 MG tablet TAKE 1 TABLET ONCE DAILY 90 tablet 1    apixaban (ELIQUIS) 2.5 MG TABS tablet Take 1 tablet by mouth 2 times daily 180 tablet 3    insulin aspart (NOVOLOG FLEXPEN) 100 UNIT/ML injection pen INJECT 10 UNITS            SUBCUTANEOUSLY BEFORE      BIGGEST MEAL AND ALSO      SLIDING SCALE 15 mL 3    Multiple Vitamins-Minerals (PRESERVISION AREDS 2+MULTI VIT PO) Take by mouth      Insulin Degludec (TRESIBA FLEXTOUCH) 100 UNIT/ML SOPN Inject 25 Units into the skin nightly 45 mL 3    nebivolol (BYSTOLIC) 5 MG tablet Take 1 tablet by mouth daily 90 tablet 1    vitamin B-6 (PYRIDOXINE) 100 MG tablet Take 100 mg by mouth daily      acetaminophen (TYLENOL) 500 MG tablet Take 500 mg by mouth every 6 hours as needed for Pain      folic acid (FOLVITE) 1 MG tablet Take 1 mg by mouth daily      latanoprost (XALATAN) 0.005 % ophthalmic solution 1 drop nightly      vitamin B-12 (CYANOCOBALAMIN) 1000 MCG tablet Take 1,000 mcg by mouth daily      psyllium (KONSYL) 28.3 % PACK Take 1 packet by mouth daily      melatonin 3 MG TABS tablet Take 3 mg by mouth nightly as needed       B-D UF III MINI PEN NEEDLES 31G X 5 MM MISC USE AND DISCARD 1 PEN      NEEDLE TWO TIMES A  each 3    Continuous Blood Gluc  (FREESTYLE KAHLIL 2 READER SYSTM) DANIEL 1 each by Does not apply route 4 times daily Test blood sugar 4 times daily 2 Device 12    Continuous Blood Gluc Sensor (FREESTYLE KAHLIL 14 DAY SENSOR) MISC 1 each by Does not apply route daily Tests blood sugar 4 times and as needed for fluctuating blood sugars 2 each 12    Elastic Bandages & Supports (WRIST SPLINT) MISC 2 each by Does not apply route nightly BILATERALLY 2 each 0    Handicap Placard MISC by Does not apply route The disability is expected to last lifetime  Dx: knee pain 1 each 0     No current facility-administered medications for this visit. Allergies   Allergen Reactions    Cilostazol      Chest quivers and increased blood sugars  Elevated blood sugar    Ace Inhibitors      HYPERKALEMIA (even low dose)    Amitriptyline     Amlodipine      Other reaction(s): Dizziness    Ammonium Hydroxide     Amoxicillin     Avandia [Rosiglitazone]     Benzalkonium Chloride     Bifidobacterium     Brimonidine Tartrate     Codeine     Diclofenac     Dorzolamide     Etodolac     Feldene [Piroxicam]     Lactobacillus     Levaquin [Levofloxacin In D5w]      Sweating, irritable.  Lexapro [Escitalopram]     Metoprolol     Metronidazole     Olmesartan      Other reaction(s): Dizziness    Pantoprazole     Penicillins     Pilocarpine Hydrochloride [Pilocarpine]      Opthalmic solution. Gives headache      Prednisone     Propoxyphene      Darvocet    Qvar [Beclomethasone] Swelling    Streptococci     Travoprost     Vasotec [Enalapril Maleate]     Vicodin [Hydrocodone-Acetaminophen]     Voltaren [Diclofenac Sodium]        Health Maintenance   Topic Date Due    DTaP/Tdap/Td vaccine (1 - Tdap) Never done    Shingles Vaccine (1 of 2) Never done    DEXA (modify frequency per FRAX score)  Never done   [employer] Visit (AWV)  Never done    Potassium monitoring  08/09/2022    Creatinine monitoring  08/09/2022    Lipid screen  09/21/2022    Flu vaccine  Completed    Pneumococcal 65+ yrs at Risk Vaccine  Completed    COVID-19 Vaccine  Completed    Hepatitis A vaccine  Aged Out    Hib vaccine  Aged Out    Meningococcal (ACWY) vaccine  Aged Out       Subjective:      Review of Systems    Objective:     /68 (Site: Left Upper Arm, Position: Sitting, Cuff Size: Large Adult)   Pulse 83   Wt 210 lb (95.3 kg)   SpO2 98%   BMI 32.41 kg/m²     Physical Exam  Vitals and nursing note reviewed. Constitutional:       Appearance: Normal appearance.  She is well-developed. HENT:      Head: Normocephalic. Right Ear: External ear normal.      Left Ear: External ear normal.      Nose: Nose normal.      Mouth/Throat:      Pharynx: No oropharyngeal exudate or posterior oropharyngeal erythema. Eyes:      Conjunctiva/sclera: Conjunctivae normal.      Pupils: Pupils are equal, round, and reactive to light. Neck:      Thyroid: No thyromegaly. Vascular: Carotid bruit present. Comments: Bilateral decreased carotid upstrokes  Cardiovascular:      Rate and Rhythm: Normal rate. Pulses: Normal pulses. Heart sounds: Murmur heard. Comments: Irregular irregular rhythm with murmur at the LSB, bradycardic  Pulmonary:      Effort: Pulmonary effort is normal. No respiratory distress. Breath sounds: Normal breath sounds. No rales. Abdominal:      General: There is no distension. Palpations: Abdomen is soft. Tenderness: There is no abdominal tenderness. Musculoskeletal:      Cervical back: Normal range of motion and neck supple. Right lower leg: Edema (Trace bilateral lower extremities) present. Left lower leg: Edema present. Skin:     Capillary Refill: Capillary refill takes less than 2 seconds. Findings: No erythema. Neurological:      General: No focal deficit present. Mental Status: She is alert and oriented to person, place, and time. Cranial Nerves: No cranial nerve deficit. Psychiatric:         Mood and Affect: Mood normal.         Behavior: Behavior normal.         Thought Content: Thought content normal.         Judgment: Judgment normal.         Assessment/Plan:     1. Type 2 diabetes mellitus with stage 1 chronic kidney disease, with long-term current use of insulin (HCC)  -     POCT glycosylated hemoglobin (Hb A1C)  2. Need for influenza vaccination  -     INFLUENZA, QUADV, ADJUVANTED, 65 YRS =, IM, PF, PREFILL SYR, 0.5ML (FLUAD)  3.  Muscle cramps at night  -     baclofen (LIORESAL) 10 MG

## 2021-10-04 NOTE — PROGRESS NOTES
[address] [facility]  [address]  Dept: [phone]  Dept ADQ:[phone]    Keren Mendoza is a 81 y.o. female who presents today for her medical conditions/complaints as notedbelow. Keren Mendoza is c/o of Results (CT results)      HPI:     HPI    [facility] on the lateral view an anterior nodular density of uncertain etiology was noted. A CT scan without contrast was recommended for further evaluation. This was completed last week and showed a 1.7 cm  Mass in the left upper lobe, most likely neoplasm with innumerable bilateral metastatic nodules. There were no previous films available for comparison except a chest x-ray done at another facility back in 2016. Piedad has no history of smoking exposure she has no history of secondhand smoking exposure no history of occupational exposures that put her at risk for lung cancer. She has no symptoms such as hemoptysis or cough. She has had decreasing exercise tolerance over the summer. Fatigue more than shortness of breath. She also has noticed increasing discomfort when laying on her left side. And aching on the left side that resolved when she more rolled over to the right side. This has been present for at least a year perhaps longer. But she had no tenderness or other focal findings on physical exam in this time. She has had no night sweats that she has had no hemoptysis. She has had weight loss gradually but this has been intentional as she has been trying to watch her diabetic diet    She does continue to have leg cramps which bother her specially at night.     BP Readings from Last 3 Encounters:   10/04/21 112/68   09/23/21 128/68   06/04/21 110/78          (goal 120/80)    Wt Readings from Last 3 Encounters:   10/04/21 207 lb (93.9 kg)   09/23/21 210 lb (95.3 kg)   06/04/21 211 lb 3.2 oz (95.8 kg)        Past Medical History:   Diagnosis Date    Anxiety     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 (HCC)     Glaucoma     Hyperlipidemia     Hypertension     Pacemaker, artificial     Peripheral vascular disease of lower extremity [facility] 12/17/2018    Type II diabetes mellitus, uncontrolled ([facility]       Past Surgical History:   Procedure Laterality Date    CHOLECYSTECTOMY      COLONOSCOPY      EYE SURGERY      HYSTERECTOMY      PACEMAKER INSERTION         Family History   Problem Relation Age of Onset    Diabetes Mother     Heart Disease Father     Lung Cancer Sister        Social History     Tobacco Use    Smoking status: Never Smoker    Smokeless tobacco: Never Used   Substance Use Topics    Alcohol use: No      Current Outpatient Medications   Medication Sig Dispense Refill    baclofen (LIORESAL) 10 MG tablet 1/2 to 1 tablet at night for cramps as needed 30 tablet 1    DULoxetine (CYMBALTA) 60 MG extended release capsule TAKE 1 CAPSULE DAILY 90 capsule 3    clopidogrel (PLAVIX) 75 MG tablet TAKE 1 TABLET DAILY 90 tablet 1    atorvastatin (LIPITOR) 40 MG tablet TAKE 1 TABLET ONCE DAILY 90 tablet 1    furosemide (LASIX) 40 MG tablet TAKE 1 TABLET ONCE DAILY 90 tablet 1    apixaban (ELIQUIS) 2.5 MG TABS tablet Take 1 tablet by mouth 2 times daily 180 tablet 3    insulin aspart (NOVOLOG FLEXPEN) 100 UNIT/ML injection pen INJECT 10 UNITS            SUBCUTANEOUSLY BEFORE      BIGGEST MEAL AND ALSO      SLIDING SCALE 15 mL 3    Multiple Vitamins-Minerals (PRESERVISION AREDS 2+MULTI VIT PO) Take by mouth      Insulin Degludec (TRESIBA FLEXTOUCH) 100 UNIT/ML SOPN Inject 25 Units into the skin nightly 45 mL 3    nebivolol (BYSTOLIC) 5 MG tablet Take 1 tablet by mouth daily 90 tablet 1    vitamin B-6 (PYRIDOXINE) 100 MG tablet Take 100 mg by mouth daily      acetaminophen (TYLENOL) 500 MG tablet Take 500 mg by mouth every 6 hours as needed for Pain      folic acid (FOLVITE) 1 MG tablet Take 1 mg by mouth daily      latanoprost (XALATAN) 0.005 % ophthalmic solution 1 drop nightly      vitamin B-12 (CYANOCOBALAMIN) 1000 MCG tablet Take 1,000 mcg by mouth daily      psyllium (KONSYL) 28.3 % PACK Take 1 packet by mouth daily      melatonin 3 MG TABS tablet Take 3 mg by mouth nightly as needed       B-D UF III MINI PEN NEEDLES 31G X 5 MM MISC USE AND DISCARD 1 PEN      NEEDLE TWO TIMES A  each 3    Continuous Blood Gluc  (FREESTYLE KAHLIL 2 READER SYSTM) DANIEL 1 each by Does not apply route 4 times daily Test blood sugar 4 times daily 2 Device 12    Continuous Blood Gluc Sensor (FREESTYLE KAHLIL 14 DAY SENSOR) MISC 1 each by Does not apply route daily Tests blood sugar 4 times and as needed for fluctuating blood sugars 2 each 12    Elastic Bandages & Supports (WRIST SPLINT) MISC 2 each by Does not apply route nightly BILATERALLY 2 each 0    Handicap Placard MISC by Does not apply route The disability is expected to last lifetime  Dx: knee pain 1 each 0     No current facility-administered medications for this visit. Allergies   Allergen Reactions    Cilostazol      Chest quivers and increased blood sugars  Elevated blood sugar    Ace Inhibitors      HYPERKALEMIA (even low dose)    Amitriptyline     Amlodipine      Other reaction(s): Dizziness    Ammonium Hydroxide     Amoxicillin     Avandia [Rosiglitazone]     Benzalkonium Chloride     Bifidobacterium     Brimonidine Tartrate     Codeine     Diclofenac     Dorzolamide     Etodolac     Feldene [Piroxicam]     Lactobacillus     Levaquin [Levofloxacin In D5w]      Sweating, irritable.  Lexapro [Escitalopram]     Metoprolol     Metronidazole     Olmesartan      Other reaction(s): Dizziness    Pantoprazole     Penicillins     Pilocarpine Hydrochloride [Pilocarpine]      Opthalmic solution.  Gives headache      Prednisone     Propoxyphene      Darvocet    Qvar [Beclomethasone] Swelling    Streptococci     Travoprost     Vasotec [Enalapril Maleate]     Vicodin [Hydrocodone-Acetaminophen]     Voltaren [Diclofenac Sodium]        Health Maintenance   Topic Date Due    DTaP/Tdap/Td vaccine (1 - Tdap) Never done    Shingles Vaccine (1 of 2) Never done    DEXA (modify frequency per FRAX score)  Never done   [employer] Visit (AWV)  Never done    Potassium monitoring  08/09/2022    Creatinine monitoring  08/09/2022    Lipid screen  09/21/2022    Flu vaccine  Completed    Pneumococcal 65+ yrs at Risk Vaccine  Completed    COVID-19 Vaccine  Completed    Hepatitis A vaccine  Aged Out    Hib vaccine  Aged Out    Meningococcal (ACWY) vaccine  Aged Out       Subjective:      Review of Systems    Objective:     /68 (Site: Left Upper Arm, Position: Sitting, Cuff Size: Large Adult)   Pulse 97   Wt 207 lb (93.9 kg)   SpO2 98%   BMI 31.94 kg/m²     Physical Exam  Vitals and nursing note reviewed. Constitutional:       Appearance: Normal appearance. Cardiovascular:      Rate and Rhythm: Normal rate. Pulmonary:      Effort: Pulmonary effort is normal.   Neurological:      Mental Status: She is alert. Psychiatric:         Mood and Affect: Mood normal.         Behavior: Behavior normal.         Thought Content: Thought content normal.         Judgment: Judgment normal.         Assessment/Plan:     1. Malignant neoplasm of upper lobe of left lung ([address]  -     Comprehensive Metabolic Panel; Future  2. Stage 4 chronic kidney disease ([address]  3. Chronic atrial fibrillation (HCC)  -     Comprehensive Metabolic Panel; Future  4. Leg cramps  -     Comprehensive Metabolic Panel; Future  -     Iron and TIBC; Future  -     Magnesium; Future  5. Anemia due to stage 4 chronic kidney disease ([address]  6. Iron deficiency anemia, unspecified iron deficiency anemia type    Alejandra Samuels has multiple comorbidities which will make treatment of malignancy more complicated.   Alejandra Samuels herself is not sure that she wants to treat any cancer. She would like some time to think about this and discuss with her family. Did offer referral to oncology to talk about what the work-up would involve and what potential palliative treatment could be offered pending definitive diagnosis. She will discuss this with her family and let us know if she would like an oncology referral.  Reassured that regardless of her decision we will be there to be supportive and help her have the best quality of life possible. Son Autumn Ramirez present with her today. Will check a CMP as we do not have recent liver functions and BUN and creatinine have been variable. We will also check magnesium level in light of her recent cramps and a ferritin as she does have the borderline anemia which is likely related chronic kidney disease but would like to rule out iron deficiency. Lab Results   Component Value Date    WBC 7.1 09/21/2021    HGB 11.8 (L) 09/21/2021    HCT 35.3 (L) 09/21/2021    .5 09/21/2021    CHOL 93 09/21/2021    TRIG 114 09/21/2021    HDL 41 09/21/2021    ALT 17 09/01/2020    AST 21 09/01/2020     03/11/2021    K 5.0 03/11/2021     03/11/2021    CREATININE 1.69 (H) 03/11/2021    BUN 51 (H) 03/11/2021    CO2 24 03/11/2021    TSH 0.775 03/02/2018    INR 1.32 (H) 02/06/2020    LABA1C 6.8 09/23/2021    LABA1C 7.3 06/04/2021    LABA1C 7.3 03/03/2021       Return in about 3 months (around 1/4/2022) for Medication recheck, As scheduled. Patient given educational materials - see patientinstructions. Discussed use, benefit, and side effects of prescribed medications. All patient questions answered. Pt voiced understanding. Reviewed health maintenance. Instructed to continue current medications, diet andexercise. Patient agreed with treatment plan. Follow up as directed.      (Please note that portions of this note were completed with a voice-recognition program. Efforts were made to edit the dictation but occasionally words are mis-transcribed.)    Electronically signed by Margarita Gibson MD on 10/4/2021

## 2021-10-05 NOTE — ADDENDUM NOTE
Addended by: Faviola Lisa on: 7/31/2020 07:29 AM     Modules accepted: Orders
Addended by: Judi Ballard on: 7/30/2020 10:03 AM     Modules accepted: Orders
Addended by: Magui Key on: 7/30/2020 10:01 AM     Modules accepted: Orders
na

## 2021-10-07 NOTE — TELEPHONE ENCOUNTER
Patient called today to let Anastasia Dos Santos know she choose  for oncology. And she was wondering if  saw her lab work it is in her chart from 10/4. Malnutrition...

## 2021-10-25 NOTE — TELEPHONE ENCOUNTER
Spoke with [facility] - they received faxed chart notes but states they do not include the compliance statement. The chart notes must states that she understands and has been using the continuous glucose monitor. Note needs addendum added and then faxed to [phone]. Without these note they cannot dispense her supplies.

## 2021-11-19 PROBLEM — R05.9 COUGH: Status: ACTIVE | Noted: 2021-01-01

## 2021-11-19 NOTE — PROGRESS NOTES
David Khan is a 81 y.o. female evaluated via telephone on 11/19/2021. Consent:  She and/or health care decision maker is aware that that she may receive a bill for this telephone service, depending on her insurance coverage, and has provided verbal consent to proceed: Yes      Documentation:  I communicated with the patient and/or health care decision maker about sore and cough x1 week, son-in-law lives with patient and had similar symptoms tested negative for Covid with the [employer] rapid testing. Patient also has multiple comorbidities and currently concerns for lung cancer, one episode of blood in sputum this morning, chronic weakness     lives with her daughter. Is under close supervision and gets Meals on Wheels, continues to have appetite and drinking okay     details of this discussion including any medical advice provided:     Diagnosis Orders   1. Cough  COVID-19      Education provided on what is known about the COVID-19 virus. Encouraged quarantining from asymptomatic family members and isolating from community 10 to 14 days. Follow all health department guidelines. Isolation and quarantine defined    Enouraged self-care with an emphasis on hydration, nutritional intakes, small frequent meals, consider protein shakes, rest, humid showers, pumping calfs and short walks, cough and deep breathe    Additionally disinfecting home with soap and warm diluted bleach water    If symptoms persist worsen and unable to care for self at home or accompanied by severe chest pain and shortness of breath::: to the ER immediatley      I affirm this is a Patient Initiated Episode with a Patient who has not had a related appointment within my department in the past 7 days or scheduled within the next 24 hours.     Patient identification was verified at the start of the visit: Yes    Total Time: minutes: 5-10 minutes    The visit was conducted pursuant to the emergency declaration under the [address] and the National Emergencies Act, [facility] waiver authority and the [employer] and [employer] General Act. Patient identification was verified, and a caregiver was present when appropriate. The patient was located in a state where the provider was credentialed to provide care.     Note: not billable if this call serves to triage the patient into an appointment for the relevant concern      Hafsa Curtis, APRN - CNP

## 2021-12-07 NOTE — TELEPHONE ENCOUNTER
Keren Mendoza is requesting a refill on the following medication(s):  Requested Prescriptions     Pending Prescriptions Disp Refills    B-D UF III MINI PEN NEEDLES 31G X 5 MM MISC [Pharmacy Med Name: BD MINI NDL  PEN [id number]] 100 each 3     Sig: USE AND DISCARD 1 PEN      NEEDLE SUBCUTANEOUSLY 4    TIMES A DAY BEFORE MEALS   AND NIGHTLY       Last Visit Date (If Applicable):  59/9/1946    Next Visit Date:    1/24/2022

## 2021-12-09 NOTE — TELEPHONE ENCOUNTER
Tg Saeed is requesting a refill on the following medication(s):  Requested Prescriptions     Pending Prescriptions Disp Refills    BYSTOLIC 5 MG tablet [Pharmacy Med Name: BYSTOLIC TAB 5MG] 90 tablet 1     Sig: TAKE 1 TABLET DAILY       Last Visit Date (If Applicable):  91/9/9117    Next Visit Date:    1/24/2022

## 2021-12-14 NOTE — TELEPHONE ENCOUNTER
Azra Pollack is requesting a refill on the following medication(s):  Requested Prescriptions     Pending Prescriptions Disp Refills    Insulin Pen Needle (B-D UF III MINI PEN NEEDLES) 31G X 5 MM MISC 100 each 5     Sig: USE AND DISCARD 1 PEN      NEEDLE SUBCUTANEOUSLY 4    TIMES A DAY BEFORE MEALS   AND NIGHTLY       Last Visit Date (If Applicable):  85/76/9574    Next Visit Date:    Visit date not found

## 2021-12-19 PROBLEM — R05.9 COUGH: Status: RESOLVED | Noted: 2021-01-01 | Resolved: 2021-01-01

## 2022-01-01 ENCOUNTER — TELEPHONE (OUTPATIENT)
Dept: FAMILY MEDICINE CLINIC | Age: 87
End: 2022-01-01

## 2022-01-01 ENCOUNTER — OFFICE VISIT (OUTPATIENT)
Dept: FAMILY MEDICINE CLINIC | Age: 87
End: 2022-01-01
Payer: MEDICARE

## 2022-01-01 VITALS
SYSTOLIC BLOOD PRESSURE: 128 MMHG | HEIGHT: 67 IN | DIASTOLIC BLOOD PRESSURE: 86 MMHG | BODY MASS INDEX: 33.9 KG/M2 | HEART RATE: 61 BPM | OXYGEN SATURATION: 98 % | WEIGHT: 216 LBS

## 2022-01-01 DIAGNOSIS — I11.0 HYPERTENSIVE HEART DISEASE WITH CHRONIC COMBINED SYSTOLIC AND DIASTOLIC CONGESTIVE HEART FAILURE (HCC): ICD-10-CM

## 2022-01-01 DIAGNOSIS — I50.42 HYPERTENSIVE HEART DISEASE WITH CHRONIC COMBINED SYSTOLIC AND DIASTOLIC CONGESTIVE HEART FAILURE (HCC): ICD-10-CM

## 2022-01-01 DIAGNOSIS — E11.65 UNCONTROLLED TYPE 2 DIABETES MELLITUS WITH HYPERGLYCEMIA (HCC): ICD-10-CM

## 2022-01-01 DIAGNOSIS — I50.42 CHRONIC COMBINED SYSTOLIC AND DIASTOLIC CONGESTIVE HEART FAILURE (HCC): Primary | ICD-10-CM

## 2022-01-01 DIAGNOSIS — D63.1 ANEMIA DUE TO STAGE 4 CHRONIC KIDNEY DISEASE (HCC): ICD-10-CM

## 2022-01-01 DIAGNOSIS — E11.21 TYPE 2 DIABETES MELLITUS WITH DIABETIC NEPHROPATHY, WITH LONG-TERM CURRENT USE OF INSULIN (HCC): ICD-10-CM

## 2022-01-01 DIAGNOSIS — Z79.4 TYPE 2 DIABETES MELLITUS WITH DIABETIC NEPHROPATHY, WITH LONG-TERM CURRENT USE OF INSULIN (HCC): ICD-10-CM

## 2022-01-01 DIAGNOSIS — I48.20 CHRONIC ATRIAL FIBRILLATION (HCC): Primary | ICD-10-CM

## 2022-01-01 DIAGNOSIS — N18.4 ANEMIA DUE TO STAGE 4 CHRONIC KIDNEY DISEASE (HCC): ICD-10-CM

## 2022-01-01 DIAGNOSIS — E11.22 TYPE 2 DIABETES MELLITUS WITH STAGE 1 CHRONIC KIDNEY DISEASE, WITH LONG-TERM CURRENT USE OF INSULIN (HCC): ICD-10-CM

## 2022-01-01 DIAGNOSIS — I48.20 CHRONIC ATRIAL FIBRILLATION (HCC): ICD-10-CM

## 2022-01-01 DIAGNOSIS — Z79.4 TYPE 2 DIABETES MELLITUS WITH STAGE 1 CHRONIC KIDNEY DISEASE, WITH LONG-TERM CURRENT USE OF INSULIN (HCC): ICD-10-CM

## 2022-01-01 DIAGNOSIS — I73.9 PVD (PERIPHERAL VASCULAR DISEASE) (HCC): ICD-10-CM

## 2022-01-01 DIAGNOSIS — E11.21 DIABETIC GLOMERULOPATHY (HCC): ICD-10-CM

## 2022-01-01 DIAGNOSIS — N18.4 STAGE 4 CHRONIC KIDNEY DISEASE (HCC): ICD-10-CM

## 2022-01-01 DIAGNOSIS — N18.1 TYPE 2 DIABETES MELLITUS WITH STAGE 1 CHRONIC KIDNEY DISEASE, WITH LONG-TERM CURRENT USE OF INSULIN (HCC): ICD-10-CM

## 2022-01-01 DIAGNOSIS — Z79.4 TYPE 2 DIABETES MELLITUS WITH DIABETIC NEPHROPATHY, WITH LONG-TERM CURRENT USE OF INSULIN (HCC): Primary | ICD-10-CM

## 2022-01-01 DIAGNOSIS — C34.12 MALIGNANT NEOPLASM OF UPPER LOBE OF LEFT LUNG (HCC): ICD-10-CM

## 2022-01-01 DIAGNOSIS — E11.21 TYPE 2 DIABETES MELLITUS WITH DIABETIC NEPHROPATHY, WITH LONG-TERM CURRENT USE OF INSULIN (HCC): Primary | ICD-10-CM

## 2022-01-01 LAB
ANION GAP SERPL CALCULATED.3IONS-SCNC: 5.8 MMOL/L
ANION GAP SERPL CALCULATED.3IONS-SCNC: 9.8 MMOL/L
B-TYPE NATRIURETIC PEPTIDE: 2300 PG/ML (ref 0–100)
BASOPHILS %: 1.01 (ref 0–3)
BASOPHILS ABSOLUTE: 0.07 (ref 0–0.3)
BUN BLDV-MCNC: 61 MG/DL (ref 7–17)
BUN BLDV-MCNC: 65 MG/DL (ref 7–17)
CALCIUM SERPL-MCNC: 8.2 MG/DL (ref 8.4–10.2)
CALCIUM SERPL-MCNC: 8.6 MG/DL (ref 8.4–10.2)
CHLORIDE BLD-SCNC: 103 MMOL/L (ref 98–120)
CHLORIDE BLD-SCNC: 106 MMOL/L (ref 98–120)
CO2: 25 MMOL/L (ref 22–31)
CO2: 25 MMOL/L (ref 22–31)
CREAT SERPL-MCNC: 1.7 MG/DL (ref 0.5–1)
CREAT SERPL-MCNC: 1.8 MG/DL (ref 0.5–1)
CREATININE CLEARANCE: 23.05
CREATININE CLEARANCE: 24.41
EOSINOPHILS %: 1.07 (ref 0–10)
EOSINOPHILS ABSOLUTE: 0.07 (ref 0–1.1)
GFR CALCULATED: 28.4
GFR CALCULATED: 30.4
GLUCOSE: 179 MG/DL (ref 65–105)
GLUCOSE: 58 MG/DL (ref 65–105)
HBA1C MFR BLD: 6.7 %
HCT VFR BLD CALC: 35.6 % (ref 37–47)
HEMOGLOBIN: 10.6 (ref 12–16)
LYMPHOCYTE %: 9.28 (ref 20–51.1)
LYMPHOCYTES ABSOLUTE: 0.62 (ref 1–5.5)
MCH RBC QN AUTO: 27.7 PG (ref 28.5–32.5)
MCHC RBC AUTO-ENTMCNC: 29.8 G/DL (ref 32–37)
MCV RBC AUTO: 93 FL (ref 80–94)
MONOCYTES %: 14.12 (ref 1.7–9.3)
MONOCYTES ABSOLUTE: 0.94 (ref 0.1–1)
NEUTROPHILS %: 74.51 (ref 42.2–75.2)
NEUTROPHILS ABSOLUTE: 4.96 (ref 2–8.1)
PDW BLD-RTO: 15.8 % (ref 8.5–15.5)
PLATELET # BLD: 201.6 THOU/MM3 (ref 130–400)
POTASSIUM SERPL-SCNC: 4.4 MMOL/L (ref 3.6–5)
POTASSIUM SERPL-SCNC: 4.7 MMOL/L (ref 3.6–5)
RBC: 3.83 M/UL (ref 4.2–5.4)
SODIUM BLD-SCNC: 137 MMOL/L (ref 135–145)
SODIUM BLD-SCNC: 138 MMOL/L (ref 135–145)
WBC: 6.7 THOU/ML3 (ref 4.8–10.8)

## 2022-01-01 PROCEDURE — 99214 OFFICE O/P EST MOD 30 MIN: CPT | Performed by: FAMILY MEDICINE

## 2022-01-01 PROCEDURE — 1123F ACP DISCUSS/DSCN MKR DOCD: CPT | Performed by: FAMILY MEDICINE

## 2022-01-01 PROCEDURE — 1090F PRES/ABSN URINE INCON ASSESS: CPT | Performed by: FAMILY MEDICINE

## 2022-01-01 PROCEDURE — G8484 FLU IMMUNIZE NO ADMIN: HCPCS | Performed by: FAMILY MEDICINE

## 2022-01-01 PROCEDURE — 1036F TOBACCO NON-USER: CPT | Performed by: FAMILY MEDICINE

## 2022-01-01 PROCEDURE — G8417 CALC BMI ABV UP PARAM F/U: HCPCS | Performed by: FAMILY MEDICINE

## 2022-01-01 PROCEDURE — PBSHW POCT GLYCOSYLATED HEMOGLOBIN (HGB A1C): Performed by: FAMILY MEDICINE

## 2022-01-01 PROCEDURE — G8427 DOCREV CUR MEDS BY ELIG CLIN: HCPCS | Performed by: FAMILY MEDICINE

## 2022-01-01 PROCEDURE — 4040F PNEUMOC VAC/ADMIN/RCVD: CPT | Performed by: FAMILY MEDICINE

## 2022-01-01 PROCEDURE — 83036 HEMOGLOBIN GLYCOSYLATED A1C: CPT | Performed by: FAMILY MEDICINE

## 2022-01-01 PROCEDURE — 99213 OFFICE O/P EST LOW 20 MIN: CPT | Performed by: FAMILY MEDICINE

## 2022-01-01 PROCEDURE — G8400 PT W/DXA NO RESULTS DOC: HCPCS | Performed by: FAMILY MEDICINE

## 2022-01-01 RX ORDER — CLOPIDOGREL BISULFATE 75 MG/1
TABLET ORAL
Qty: 90 TABLET | Refills: 1 | Status: SHIPPED | OUTPATIENT
Start: 2022-01-01

## 2022-01-01 RX ORDER — METOPROLOL SUCCINATE 25 MG/1
1 TABLET, EXTENDED RELEASE ORAL 2 TIMES DAILY
COMMUNITY
Start: 2022-01-01

## 2022-01-01 RX ORDER — INSULIN ASPART 100 [IU]/ML
INJECTION, SOLUTION INTRAVENOUS; SUBCUTANEOUS
Qty: 15 ML | Refills: 3 | Status: SHIPPED | OUTPATIENT
Start: 2022-01-01

## 2022-01-01 RX ORDER — ATORVASTATIN CALCIUM 40 MG/1
TABLET, FILM COATED ORAL
Qty: 90 TABLET | Refills: 1 | Status: SHIPPED | OUTPATIENT
Start: 2022-01-01

## 2022-01-01 RX ORDER — FUROSEMIDE 40 MG/1
TABLET ORAL
Qty: 90 TABLET | Refills: 1 | Status: SHIPPED | OUTPATIENT
Start: 2022-01-01

## 2022-01-01 RX ORDER — INSULIN DEGLUDEC INJECTION 100 U/ML
INJECTION, SOLUTION SUBCUTANEOUS
Qty: 45 ML | Refills: 3 | Status: SHIPPED | OUTPATIENT
Start: 2022-01-01

## 2022-01-01 RX ORDER — PEN NEEDLE, DIABETIC 31 GX5/16"
NEEDLE, DISPOSABLE MISCELLANEOUS
Qty: 360 EACH | Refills: 5 | Status: SHIPPED | OUTPATIENT
Start: 2022-01-01

## 2022-01-01 ASSESSMENT — PATIENT HEALTH QUESTIONNAIRE - PHQ9
2. FEELING DOWN, DEPRESSED OR HOPELESS: 0
SUM OF ALL RESPONSES TO PHQ QUESTIONS 1-9: 0
SUM OF ALL RESPONSES TO PHQ9 QUESTIONS 1 & 2: 0
SUM OF ALL RESPONSES TO PHQ QUESTIONS 1-9: 0
1. LITTLE INTEREST OR PLEASURE IN DOING THINGS: 0
SUM OF ALL RESPONSES TO PHQ QUESTIONS 1-9: 0
SUM OF ALL RESPONSES TO PHQ QUESTIONS 1-9: 0

## 2022-01-20 NOTE — TELEPHONE ENCOUNTER
Valery Carter is requesting a refill on the following medication(s):  Requested Prescriptions     Pending Prescriptions Disp Refills    atorvastatin (LIPITOR) 40 MG tablet [Pharmacy Med Name: ATORVASTATIN TAB 40MG] 90 tablet 1     Sig: TAKE 1 TABLET ONCE DAILY       Last Visit Date (If Applicable):  00/0/6200    Next Visit Date:    1/24/2022

## 2022-01-20 NOTE — TELEPHONE ENCOUNTER
Timmy Joyner is requesting a refill on the following medication(s):  Requested Prescriptions     Pending Prescriptions Disp Refills    furosemide (LASIX) 40 MG tablet [Pharmacy Med Name: FUROSEMIDE TAB 40MG] 90 tablet 1     Sig: TAKE 1 TABLET ONCE DAILY       Last Visit Date (If Applicable):  43/5/1312    Next Visit Date:    1/24/2022

## 2022-01-24 PROBLEM — D63.1 ANEMIA DUE TO STAGE 4 CHRONIC KIDNEY DISEASE (HCC): Status: ACTIVE | Noted: 2022-01-01

## 2022-01-24 PROBLEM — N18.4 ANEMIA DUE TO STAGE 4 CHRONIC KIDNEY DISEASE (HCC): Status: ACTIVE | Noted: 2022-01-01

## 2022-01-24 PROBLEM — C34.12 MALIGNANT NEOPLASM OF UPPER LOBE OF LEFT LUNG (HCC): Status: ACTIVE | Noted: 2022-01-01

## 2022-01-24 NOTE — PROGRESS NOTES
[address] [facility]  [address]  Dept: [phone]  Dept DUV:[phone]    Timmy Joyner is a 81 y.o. female who presents today for her medical conditions/complaints as notedbelow. Timmy Joyner is c/o of Follow-up, Hypertension, and Diabetes      HPI:     HPI    Timmy Joyner is a 81 y.o. female who presents for follow-up of hypertension, diabetes, and hyperlipidemia. She indicates that she is feeling well and denies any symptoms referable to her elevated blood pressure or diabetes. Specifically denies chest pain, palpitations, thirst, frequent urination, and blurred vision. Current medication regimen is as listed below. She denies any side effects of medication, and has been taking it regularly. Home glucose readings have been well controlled. Checks blood sugars 4 times daily, no low sugars-- lowest is in the 80's rarely over 200. Lass than 1 time per mnth is it below 80. Last eye exam was very recently and is later this month witheye specialist. Diabetic complications includeretinopathy, nephropathy, neuropathy and CAD. shehas been exercising regularly trying to bike but not as regularly. Hasbeen following a diabeticdiet. Has been seeing Pulmonology in November and decided not to do the biopsy-- Will monitor this. Has known mass in her lungs and this is thought to be cancer. No sx at this time but has the baseline SOB. Worse with activity and resolves with rest.  Will also have intermittent sharp chest pain. Does have the Leg swelling mild also. She decided she did not want to do the needle biopsy. Will continue. Is seeing ophthamology again this week-- stable. Has the ongoing the LE numbness. Has the radiculopathy. Still has the decreased sensation. Felt like she had some \"throbbing in her insides\"  The pacemaker people called at that time also. They were not concerned and will be monitering for now.   Better when she sat up. No other sx with this. Review of the Cardiology notes she had some \"noise\" in the leads and no worrisome abnormalities. Has the ongoing tongue pain. Feels like there is something on there. Nothing is there. Only there at night. Has the lood work for Dr. Ricky Sood scheduled in March. Chronic upper left arm pain that comes and goes. Still has the ongoing dizziness when she stands-- more off balance when she standsx up-- no recent falls. No change. Has to be careful when she stands up. Getting out of bed etc.  Has to really take her time. Did have a URI in November. Was seen at Urgent clinic. Was negative for Covid.      BP Readings from Last 3 Encounters:   01/24/22 128/86   10/04/21 112/68   09/23/21 128/68          (goal 120/80)    Wt Readings from Last 3 Encounters:   01/24/22 216 lb (98 kg)   10/04/21 207 lb (93.9 kg)   09/23/21 210 lb (95.3 kg)        Past Medical History:   Diagnosis Date    Anxiety     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 ([address]     Glaucoma     Hyperlipidemia     Hypertension     Pacemaker, artificial     Peripheral vascular disease of lower extremity ([address] 12/17/2018    Type II diabetes mellitus, uncontrolled ([address]       Past Surgical History:   Procedure Laterality Date    CHOLECYSTECTOMY      COLONOSCOPY      EYE SURGERY      HYSTERECTOMY      PACEMAKER INSERTION         Family History   Problem Relation Age of Onset    Diabetes Mother     Heart Disease Father     Lung Cancer Sister        Social History     Tobacco Use    Smoking status: Never Smoker    Smokeless tobacco: Never Used   Substance Use Topics    Alcohol use: No      Current Outpatient Medications   Medication Sig Dispense Refill    Insulin Pen Needle (B-D UF III MINI PEN NEEDLES) 31G X 5 MM MISC USE AND DISCARD 1 PEN      NEEDLE SUBCUTANEOUSLY 4    TIMES A DAY BEFORE MEALS   AND NIGHTLY 360 each 5    atorvastatin (LIPITOR) 40 MG tablet TAKE 1 TABLET ONCE DAILY 90 tablet 1    furosemide (LASIX) 40 MG tablet TAKE 1 TABLET ONCE DAILY 90 tablet 1    BYSTOLIC 5 MG tablet TAKE 1 TABLET DAILY 90 tablet 1    bimatoprost (LUMIGAN) 0.03 % ophthalmic drops INSTILL 1 DROP INTO BOTH EYES EVERY NIGHT      baclofen (LIORESAL) 10 MG tablet 1/2 to 1 tablet at night for cramps as needed 30 tablet 1    DULoxetine (CYMBALTA) 60 MG extended release capsule TAKE 1 CAPSULE DAILY 90 capsule 3    clopidogrel (PLAVIX) 75 MG tablet TAKE 1 TABLET DAILY 90 tablet 1    apixaban (ELIQUIS) 2.5 MG TABS tablet Take 1 tablet by mouth 2 times daily 180 tablet 3    insulin aspart (NOVOLOG FLEXPEN) 100 UNIT/ML injection pen INJECT 10 UNITS            SUBCUTANEOUSLY BEFORE      BIGGEST MEAL AND ALSO      SLIDING SCALE 15 mL 3    Multiple Vitamins-Minerals (PRESERVISION AREDS 2+MULTI VIT PO) Take by mouth       Insulin Degludec (TRESIBA FLEXTOUCH) 100 UNIT/ML SOPN Inject 25 Units into the skin nightly 45 mL 3    Continuous Blood Gluc  (FREESTYLE KAHLIL 2 READER SYSTM) DANIEL 1 each by Does not apply route 4 times daily Test blood sugar 4 times daily 2 Device 12    Continuous Blood Gluc Sensor (FREESTYLE KAHLIL 14 DAY SENSOR) MISC 1 each by Does not apply route daily Tests blood sugar 4 times and as needed for fluctuating blood sugars 2 each 12    Elastic Bandages & Supports (WRIST SPLINT) MISC 2 each by Does not apply route nightly BILATERALLY 2 each 0    vitamin B-6 (PYRIDOXINE) 100 MG tablet Take 100 mg by mouth daily      acetaminophen (TYLENOL) 500 MG tablet Take 500 mg by mouth every 6 hours as needed for Pain      folic acid (FOLVITE) 1 MG tablet Take 1 mg by mouth daily      Handicap Placard MISC by Does not apply route The disability is expected to last lifetime  Dx: knee pain 1 each 0    vitamin B-12 (CYANOCOBALAMIN) 1000 MCG tablet Take 1,000 mcg by mouth daily      psyllium (KONSYL) 28.3 % PACK Take 1 packet by mouth daily      melatonin 3 MG TABS tablet Take 3 mg by mouth nightly as needed        No current facility-administered medications for this visit. Allergies   Allergen Reactions    Cilostazol      Chest quivers and increased blood sugars  Elevated blood sugar    Ace Inhibitors      HYPERKALEMIA (even low dose)    Amitriptyline     Amlodipine      Other reaction(s): Dizziness    Ammonium Hydroxide     Amoxicillin     Avandia [Rosiglitazone]     Benzalkonium Chloride     Bifidobacterium     Brimonidine Tartrate     Codeine     Diclofenac     Dorzolamide     Etodolac     Feldene [Piroxicam]     Lactobacillus     Levaquin [Levofloxacin In D5w]      Sweating, irritable.  Lexapro [Escitalopram]     Metoprolol     Metronidazole     Olmesartan      Other reaction(s): Dizziness    Pantoprazole     Penicillins     Pilocarpine Hydrochloride [Pilocarpine]      Opthalmic solution.  Gives headache      Prednisone     Propoxyphene      Darvocet    Qvar [Beclomethasone] Swelling    Streptococci     Travoprost     Vasotec [Enalapril Maleate]     Vicodin [Hydrocodone-Acetaminophen]     Voltaren [Diclofenac Sodium]        Health Maintenance   Topic Date Due    DTaP/Tdap/Td vaccine (1 - Tdap) Never done    Shingles Vaccine (1 of 2) Never done    DEXA (modify frequency per FRAX score)  Never done   [employer] Visit (AWV)  Never done    Lipid screen  09/21/2022    Potassium monitoring  10/04/2022    Creatinine monitoring  10/04/2022    Depression Screen  01/24/2023    Flu vaccine  Completed    Pneumococcal 65+ yrs at Risk Vaccine  Completed    COVID-19 Vaccine  Completed    Hepatitis A vaccine  Aged Out    Hib vaccine  Aged Out    Meningococcal (ACWY) vaccine  Aged Out       Subjective:      Review of Systems    Objective:     /86 (Site: Right Upper Arm, Position: Sitting, Cuff Size: Large Adult)   Pulse 61   Ht 5' 7\" (1.702 m)   Wt 216 lb (98 kg)   SpO2 98%   BMI 33.83 kg/m²     Physical Exam  Vitals and nursing note reviewed. Constitutional:       Appearance: Normal appearance. Cardiovascular:      Rate and Rhythm: Normal rate. Heart sounds: Normal heart sounds. Comments: Bilaterally diminished pulses but palpable on the right. Heart sounds slightly irregular  Pulmonary:      Effort: Pulmonary effort is normal.   Musculoskeletal:      Cervical back: Neck supple. No tenderness. Right lower leg: No edema. Left lower leg: No edema. Comments: Has compression hose on. There is some petechiae and senile purpura on the left lower extremity but no open wounds noted there is coolness of the left lower extremity also but good color   Lymphadenopathy:      Cervical: No cervical adenopathy. Skin:     Capillary Refill: Capillary refill takes less than 2 seconds. Neurological:      General: No focal deficit present. Mental Status: She is alert and oriented to person, place, and time. Psychiatric:         Mood and Affect: Mood normal.         Behavior: Behavior normal.         Thought Content: Thought content normal.         Judgment: Judgment normal.         Assessment/Plan:     1. Type 2 diabetes mellitus with diabetic nephropathy, with long-term current use of insulin ([facility])  -     POCT Hb A1C (glycosylated hemoglobin)  2. Malignant neoplasm of upper lobe of left lung ([facility]  3. Anemia due to stage 4 chronic kidney disease ([facility]  4. 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 PVD (peripheral vascular disease) ([facility]  6. Chronic atrial fibrillation (HCC)  7.  Uncontrolled type 2 diabetes mellitus with hyperglycemia (HCC)  -     Insulin Pen Needle (B-D UF III MINI PEN NEEDLES) 31G X 5 MM MISC; Disp-360 each, R-5, NormalUSE AND DISCARD 1 PEN      NEEDLE SUBCUTANEOUSLY 4    TIMES A DAY BEFORE MEALS   AND NIGHTLY  8. Diabetic glomerulopathy (HCC)  -     Insulin Pen Needle (B-D UF III MINI PEN NEEDLES) 31G X 5 MM MISC; Disp-360 each, R-5, NormalUSE AND DISCARD 1 PEN      NEEDLE SUBCUTANEOUSLY 4    TIMES A DAY BEFORE MEALS   AND NIGHTLY    Diabetes is very well controlled at this time without any signs of hypoglycemia. No changes in her current medications continue with the excellent monitoring of the blood sugars. Continue with her close follow-up with vascular and nephrology for her other complications related to the diabetes including her nephropathy and her peripheral vascular disease. Continue to watch the skin very closely for any signs of recurrent ulcerations. Best supportive care for the recent diagnosis of lung cancer. Will monitor for any signs or symptoms of progression. Unclear what the tremulous episodes that she experienced were related to. As they have resolved and no clear etiology we will not pursue further evaluation at this time. Lab Results   Component Value Date    WBC 7.1 09/21/2021    HGB 11.8 (L) 09/21/2021    HCT 35.3 (L) 09/21/2021    .5 09/21/2021    CHOL 93 09/21/2021    TRIG 114 09/21/2021    HDL 41 09/21/2021    ALT 28 10/04/2021    AST 34 10/04/2021     10/04/2021    K 4.7 10/04/2021     10/04/2021    CREATININE 1.6 (H) 10/04/2021    BUN 46 (H) 10/04/2021    CO2 24 10/04/2021    TSH 0.775 03/02/2018    INR 1.32 (H) 02/06/2020    LABA1C 6.7 01/24/2022    LABA1C 6.8 09/23/2021    LABA1C 7.3 06/04/2021       Return in about 4 months (around 5/24/2022) for DM follow up, HTN. Multiple labs and other testing may have been ordered which may not be completely evident from the above note due to system interface incompatibilities. Patient given educational materials - see patientinstructions. Discussed use, benefit, and side effects of prescribed medications. All patient questions answered. Pt voiced understanding. Reviewed health maintenance. Instructed to continue current medications, diet andexercise.   Patient agreed with treatment plan. Follow up as directed.      (Please note that portions of this note were completed with a voice-recognition program. Efforts were made to edit the dictation but occasionally words are mis-transcribed.)    Electronically signed by Crsi Singh MD on 1/28/2022

## 2022-02-21 NOTE — TELEPHONE ENCOUNTER
Patient called stating that her sob is ongoing but is getting worse. She stated that it is hard for her to walk without resting and she is also having issues laying down for long periods of time. George Tran also stated that her legs are very swollen and her sugars in the morning having been anywhere from I did confirm with her that she does feel dizzy and lightheaded throughout the day and while on the phone with her she was lightheaded. She also stated that she was supposed to have lazer cataract surgery done at 10:30 this morning and would like to know if she should still have that done. After speaking with Dr. Chana Chan she is not to have the procedure done at this time. She would like to her to go to the ER for evaluation and should have a chest xray done. Patient verbalized understanding and will cancel the procedure and head to the ER now.

## 2022-02-22 NOTE — TELEPHONE ENCOUNTER
Brandi Blanca is requesting a refill on the following medication(s):  Requested Prescriptions     Pending Prescriptions Disp Refills    NOVOLOG FLEXPEN 100 UNIT/ML injection pen [Pharmacy Med Name: Mariia Reed F/JT  PEN 100U/ML] 15 mL 3     Sig: INJECT 10 UNITS            SUBCUTANEOUSLY BEFORE      BIGGEST MEAL AND ALSO      SLIDING SCALE       Last Visit Date (If Applicable):  9/17/7382    Next Visit Date:    5/18/2022

## 2022-02-28 NOTE — TELEPHONE ENCOUNTER
[facility] Initial Follow Up Call    Outreach made within 2 business days of discharge: Yes    Patient: Annabelle Cole Patient : [id number]   MRN: [id number]  Reason for Admission: SOB, palpitations    Discharge Date:  22       Spoke with: patient    Discharge department/facility: [facility]    TCM Interactive Patient Contact:  Was patient able to fill all prescriptions: Yes  Was patient instructed to bring all medications to the follow-up visit: Yes  Is patient taking all medications as directed in the discharge summary? Yes  Does patient understand their discharge instructions: Yes  Does patient have questions or concerns that need addressed prior to 7-14 day follow up office visit: yes - Patient states she is still short of breath. She cannot lay down because it causes her to be more short of breath. She has to sleep sitting in a recliner. She gets short of breath after walking a few steps and she is still having episodes when she can feel her heart pound. She is really fatigued and feels like she is moving really slow. She is using the melatonin but is not really sleeping much because of how she has to sleep. She feels like she is weaker than before she was in the hospital.  She feels like her shortness of breath is the same as prior to her hospitalization.      Scheduled appointment with PCP within 7-14 days    Follow Up  Future Appointments   Date Time Provider Lorraine Zelaya   3/9/2022  1:40 PM Ramos Walker MD [facility]   2022 11:00 AM Ramos Walker MD [facility]   2022  9:40 AM Ramos Walker MD [facility]       Mildred Michel LPN

## 2022-02-28 NOTE — TELEPHONE ENCOUNTER
Please have Aida Patient let us know what her heart rate and her blood pressures are running. We may want to adjust her metoprolol to help with the heart racing. Also check to see if she is having any signs or symptoms that could be attributed to Covid as she does have a Covid exposure. Her test was negative which given to the hospital but that certainly may have changed. Chest payed head congestion that has increased cough that is new or increasing.

## 2022-03-02 NOTE — TELEPHONE ENCOUNTER
Based on the labs the BNP is markedly elevated likely related to a combination of failure and the increasing kidney labs. We cannot increase the diuretics outpatient because of the kidney functions. Continue with her current meds and see if they have cardiology follow-up scheduled if not we need to make sure that they get that pushed up as soon as possible.

## 2022-03-02 NOTE — TELEPHONE ENCOUNTER
Spoke with Ashu Rocha with [facility] cardiology. She was able to get the pt rescheduled for her echo on 3/10 at 10 am. Then see Dr. Brianna Sharma on 3/17 at 9:45 am. I called the pt and informed her of the new appointment dates. Patient verbalized understanding.

## 2022-03-02 NOTE — TELEPHONE ENCOUNTER
Make sure she is taking a full 25 mg of the metoprolol twice daily. I would like her to get her labs done tomorrow as well. Need to check her kidney functions. If they are still doing OK we will increase her water bill to twice daily for 3 days.

## 2022-03-02 NOTE — TELEPHONE ENCOUNTER
Pt is scheduled for 4/11 with cardio and has an echo the week before. Spoke with cardiology and they are going to get her rescheduled for the echo next week and see Dr. Everette Blizzard possibly 3/14. They will call our office with exact dates.

## 2022-03-02 NOTE — TELEPHONE ENCOUNTER
Alejandro Cabral from [facility] lab called with critical lab, BNP 2300 and will send results to us

## 2022-03-04 NOTE — TELEPHONE ENCOUNTER
I would like her to increase it to the full tablet twice daily because the blood pressures were running a little higher and with her heart rate being higher this will hopefully help keep the heart rate more manageable so that the fluid will come off better without increasing the water pills more

## 2022-03-04 NOTE — TELEPHONE ENCOUNTER
Daughter calling states she has been taking the 12.5mg metoprolol BID due to dizziness/falling/unsteady and states was told to take this way in the hospital confused as to why now she is having to take the 25mg BID

## 2022-03-09 NOTE — TELEPHONE ENCOUNTER
Hilary Epstein is requesting a refill on the following medication(s):  Requested Prescriptions     Pending Prescriptions Disp Refills    TRESIBA FLEXTOUCH 100 UNIT/ML SOPN [Pharmacy Med Name: TRESIBA FLEX PEN 100U/ML] 45 mL 3     Sig: INJECT 25 UNITS            SUBCUTANEOUSLY NIGHTLY       Last Visit Date (If Applicable):  6/10/3994    Next Visit Date:    5/18/2022